# Patient Record
Sex: MALE | Race: WHITE | NOT HISPANIC OR LATINO | Employment: FULL TIME | URBAN - METROPOLITAN AREA
[De-identification: names, ages, dates, MRNs, and addresses within clinical notes are randomized per-mention and may not be internally consistent; named-entity substitution may affect disease eponyms.]

---

## 2017-01-31 ENCOUNTER — COMMUNICATION - HEALTHEAST (OUTPATIENT)
Dept: FAMILY MEDICINE | Facility: CLINIC | Age: 47
End: 2017-01-31

## 2017-01-31 DIAGNOSIS — E78.2 MIXED HYPERLIPIDEMIA: ICD-10-CM

## 2017-02-06 ENCOUNTER — OFFICE VISIT - HEALTHEAST (OUTPATIENT)
Dept: FAMILY MEDICINE | Facility: CLINIC | Age: 47
End: 2017-02-06

## 2017-02-06 DIAGNOSIS — E78.2 MIXED HYPERLIPIDEMIA: ICD-10-CM

## 2017-02-06 LAB
CHOLEST SERPL-MCNC: 190 MG/DL
FASTING STATUS PATIENT QL REPORTED: YES
HDLC SERPL-MCNC: 54 MG/DL
LDLC SERPL CALC-MCNC: 114 MG/DL
TRIGL SERPL-MCNC: 109 MG/DL

## 2017-02-06 ASSESSMENT — MIFFLIN-ST. JEOR: SCORE: 1808.8

## 2017-02-07 ENCOUNTER — COMMUNICATION - HEALTHEAST (OUTPATIENT)
Dept: FAMILY MEDICINE | Facility: CLINIC | Age: 47
End: 2017-02-07

## 2017-11-30 ENCOUNTER — OFFICE VISIT - HEALTHEAST (OUTPATIENT)
Dept: FAMILY MEDICINE | Facility: CLINIC | Age: 47
End: 2017-11-30

## 2017-11-30 DIAGNOSIS — J02.9 PHARYNGITIS: ICD-10-CM

## 2017-11-30 DIAGNOSIS — E78.2 MIXED HYPERLIPIDEMIA: ICD-10-CM

## 2017-11-30 LAB
CHOLEST SERPL-MCNC: 177 MG/DL
FASTING STATUS PATIENT QL REPORTED: YES
HDLC SERPL-MCNC: 51 MG/DL
LDLC SERPL CALC-MCNC: 108 MG/DL
TRIGL SERPL-MCNC: 90 MG/DL

## 2017-11-30 ASSESSMENT — MIFFLIN-ST. JEOR: SCORE: 1788.39

## 2017-12-01 ENCOUNTER — COMMUNICATION - HEALTHEAST (OUTPATIENT)
Dept: FAMILY MEDICINE | Facility: CLINIC | Age: 47
End: 2017-12-01

## 2018-04-14 ENCOUNTER — COMMUNICATION - HEALTHEAST (OUTPATIENT)
Dept: FAMILY MEDICINE | Facility: CLINIC | Age: 48
End: 2018-04-14

## 2018-04-16 RX ORDER — ACYCLOVIR 200 MG/1
CAPSULE ORAL
Qty: 30 CAPSULE | Refills: 1 | Status: SHIPPED | OUTPATIENT
Start: 2018-04-16 | End: 2021-09-03

## 2018-05-09 ENCOUNTER — OFFICE VISIT - HEALTHEAST (OUTPATIENT)
Dept: FAMILY MEDICINE | Facility: CLINIC | Age: 48
End: 2018-05-09

## 2018-05-09 DIAGNOSIS — F41.9 ANXIETY: ICD-10-CM

## 2018-05-09 LAB
ANION GAP SERPL CALCULATED.3IONS-SCNC: 10 MMOL/L (ref 5–18)
BUN SERPL-MCNC: 19 MG/DL (ref 8–22)
CALCIUM SERPL-MCNC: 10.1 MG/DL (ref 8.5–10.5)
CHLORIDE BLD-SCNC: 107 MMOL/L (ref 98–107)
CO2 SERPL-SCNC: 25 MMOL/L (ref 22–31)
CREAT SERPL-MCNC: 0.93 MG/DL (ref 0.7–1.3)
GFR SERPL CREATININE-BSD FRML MDRD: >60 ML/MIN/1.73M2
GLUCOSE BLD-MCNC: 102 MG/DL (ref 70–125)
POTASSIUM BLD-SCNC: 3.9 MMOL/L (ref 3.5–5)
SODIUM SERPL-SCNC: 142 MMOL/L (ref 136–145)
T4 FREE SERPL-MCNC: 0.9 NG/DL (ref 0.7–1.8)
TSH SERPL DL<=0.005 MIU/L-ACNC: 1.28 UIU/ML (ref 0.3–5)

## 2018-05-09 ASSESSMENT — MIFFLIN-ST. JEOR: SCORE: 1817.87

## 2018-05-10 ENCOUNTER — COMMUNICATION - HEALTHEAST (OUTPATIENT)
Dept: FAMILY MEDICINE | Facility: CLINIC | Age: 48
End: 2018-05-10

## 2018-06-25 ENCOUNTER — COMMUNICATION - HEALTHEAST (OUTPATIENT)
Dept: FAMILY MEDICINE | Facility: CLINIC | Age: 48
End: 2018-06-25

## 2018-06-25 ENCOUNTER — OFFICE VISIT - HEALTHEAST (OUTPATIENT)
Dept: FAMILY MEDICINE | Facility: CLINIC | Age: 48
End: 2018-06-25

## 2018-06-25 DIAGNOSIS — F41.9 ANXIETY: ICD-10-CM

## 2018-06-25 DIAGNOSIS — E78.2 MIXED HYPERLIPIDEMIA: ICD-10-CM

## 2018-06-25 LAB
ALBUMIN SERPL-MCNC: 4.1 G/DL (ref 3.5–5)
ALP SERPL-CCNC: 57 U/L (ref 45–120)
ALT SERPL W P-5'-P-CCNC: 28 U/L (ref 0–45)
AST SERPL W P-5'-P-CCNC: 26 U/L (ref 0–40)
BILIRUB DIRECT SERPL-MCNC: 0.2 MG/DL
BILIRUB SERPL-MCNC: 0.7 MG/DL (ref 0–1)
CHOLEST SERPL-MCNC: 177 MG/DL
FASTING STATUS PATIENT QL REPORTED: YES
HDLC SERPL-MCNC: 52 MG/DL
LDLC SERPL CALC-MCNC: 107 MG/DL
PROT SERPL-MCNC: 6.7 G/DL (ref 6–8)
TRIGL SERPL-MCNC: 90 MG/DL

## 2018-06-25 ASSESSMENT — MIFFLIN-ST. JEOR: SCORE: 1831.48

## 2018-08-13 ENCOUNTER — OFFICE VISIT - HEALTHEAST (OUTPATIENT)
Dept: FAMILY MEDICINE | Facility: CLINIC | Age: 48
End: 2018-08-13

## 2018-08-13 DIAGNOSIS — M72.2 PLANTAR FASCIITIS: ICD-10-CM

## 2018-08-13 DIAGNOSIS — F41.9 ANXIETY: ICD-10-CM

## 2018-12-31 ENCOUNTER — OFFICE VISIT - HEALTHEAST (OUTPATIENT)
Dept: FAMILY MEDICINE | Facility: CLINIC | Age: 48
End: 2018-12-31

## 2018-12-31 DIAGNOSIS — E78.2 MIXED HYPERLIPIDEMIA: ICD-10-CM

## 2018-12-31 DIAGNOSIS — Z12.11 SCREEN FOR COLON CANCER: ICD-10-CM

## 2018-12-31 DIAGNOSIS — M79.671 RIGHT FOOT PAIN: ICD-10-CM

## 2018-12-31 DIAGNOSIS — F41.9 ANXIETY: ICD-10-CM

## 2018-12-31 LAB
ALBUMIN SERPL-MCNC: 4.3 G/DL (ref 3.5–5)
ALP SERPL-CCNC: 54 U/L (ref 45–120)
ALT SERPL W P-5'-P-CCNC: 56 U/L (ref 0–45)
AST SERPL W P-5'-P-CCNC: 30 U/L (ref 0–40)
BILIRUB DIRECT SERPL-MCNC: 0.2 MG/DL
BILIRUB SERPL-MCNC: 0.8 MG/DL (ref 0–1)
CHOLEST SERPL-MCNC: 235 MG/DL
FASTING STATUS PATIENT QL REPORTED: YES
HDLC SERPL-MCNC: 55 MG/DL
LDLC SERPL CALC-MCNC: 153 MG/DL
PROT SERPL-MCNC: 7.3 G/DL (ref 6–8)
TRIGL SERPL-MCNC: 137 MG/DL

## 2019-02-08 ENCOUNTER — RECORDS - HEALTHEAST (OUTPATIENT)
Dept: ADMINISTRATIVE | Facility: OTHER | Age: 49
End: 2019-02-08

## 2019-07-22 ENCOUNTER — COMMUNICATION - HEALTHEAST (OUTPATIENT)
Dept: FAMILY MEDICINE | Facility: CLINIC | Age: 49
End: 2019-07-22

## 2019-07-22 DIAGNOSIS — F41.9 ANXIETY: ICD-10-CM

## 2019-07-22 DIAGNOSIS — E78.2 MIXED HYPERLIPIDEMIA: ICD-10-CM

## 2019-08-01 ENCOUNTER — OFFICE VISIT - HEALTHEAST (OUTPATIENT)
Dept: FAMILY MEDICINE | Facility: CLINIC | Age: 49
End: 2019-08-01

## 2019-08-01 DIAGNOSIS — E78.2 MIXED HYPERLIPIDEMIA: ICD-10-CM

## 2019-08-01 DIAGNOSIS — F41.9 ANXIETY: ICD-10-CM

## 2019-08-01 DIAGNOSIS — Z11.4 SCREENING FOR HUMAN IMMUNODEFICIENCY VIRUS: ICD-10-CM

## 2019-08-01 DIAGNOSIS — B00.1 RECURRENT HERPES LABIALIS: ICD-10-CM

## 2019-08-01 LAB
ALBUMIN SERPL-MCNC: 4.3 G/DL (ref 3.5–5)
ALP SERPL-CCNC: 54 U/L (ref 45–120)
ALT SERPL W P-5'-P-CCNC: 33 U/L (ref 0–45)
AST SERPL W P-5'-P-CCNC: 23 U/L (ref 0–40)
BILIRUB DIRECT SERPL-MCNC: 0.2 MG/DL
BILIRUB SERPL-MCNC: 0.8 MG/DL (ref 0–1)
CHOLEST SERPL-MCNC: 207 MG/DL
FASTING STATUS PATIENT QL REPORTED: YES
HDLC SERPL-MCNC: 56 MG/DL
HIV 1+2 AB+HIV1 P24 AG SERPL QL IA: NEGATIVE
LDLC SERPL CALC-MCNC: 135 MG/DL
PROT SERPL-MCNC: 6.9 G/DL (ref 6–8)
TRIGL SERPL-MCNC: 82 MG/DL

## 2019-08-01 ASSESSMENT — MIFFLIN-ST. JEOR: SCORE: 1786.12

## 2019-08-05 ENCOUNTER — COMMUNICATION - HEALTHEAST (OUTPATIENT)
Dept: FAMILY MEDICINE | Facility: CLINIC | Age: 49
End: 2019-08-05

## 2019-09-23 ENCOUNTER — RECORDS - HEALTHEAST (OUTPATIENT)
Dept: ADMINISTRATIVE | Facility: OTHER | Age: 49
End: 2019-09-23

## 2019-11-11 ENCOUNTER — AMBULATORY - HEALTHEAST (OUTPATIENT)
Dept: OTHER | Facility: CLINIC | Age: 49
End: 2019-11-11

## 2020-01-22 ENCOUNTER — OFFICE VISIT - HEALTHEAST (OUTPATIENT)
Dept: FAMILY MEDICINE | Facility: CLINIC | Age: 50
End: 2020-01-22

## 2020-01-22 DIAGNOSIS — E78.2 MIXED HYPERLIPIDEMIA: ICD-10-CM

## 2020-01-22 DIAGNOSIS — Z00.00 WELL ADULT EXAM: ICD-10-CM

## 2020-01-22 DIAGNOSIS — B00.1 RECURRENT HERPES LABIALIS: ICD-10-CM

## 2020-01-22 DIAGNOSIS — F41.9 ANXIETY: ICD-10-CM

## 2020-01-22 DIAGNOSIS — L98.9 SKIN LESION: ICD-10-CM

## 2020-01-22 DIAGNOSIS — R53.83 FATIGUE, UNSPECIFIED TYPE: ICD-10-CM

## 2020-01-22 LAB
ALBUMIN SERPL-MCNC: 4.2 G/DL (ref 3.5–5)
ALP SERPL-CCNC: 58 U/L (ref 45–120)
ALT SERPL W P-5'-P-CCNC: 26 U/L (ref 0–45)
ANION GAP SERPL CALCULATED.3IONS-SCNC: 7 MMOL/L (ref 5–18)
AST SERPL W P-5'-P-CCNC: 22 U/L (ref 0–40)
BILIRUB DIRECT SERPL-MCNC: 0.2 MG/DL
BILIRUB SERPL-MCNC: 0.7 MG/DL (ref 0–1)
BUN SERPL-MCNC: 14 MG/DL (ref 8–22)
CALCIUM SERPL-MCNC: 9.8 MG/DL (ref 8.5–10.5)
CHLORIDE BLD-SCNC: 105 MMOL/L (ref 98–107)
CHOLEST SERPL-MCNC: 204 MG/DL
CO2 SERPL-SCNC: 29 MMOL/L (ref 22–31)
CREAT SERPL-MCNC: 0.95 MG/DL (ref 0.7–1.3)
ERYTHROCYTE [DISTWIDTH] IN BLOOD BY AUTOMATED COUNT: 12 % (ref 11–14.5)
FASTING STATUS PATIENT QL REPORTED: YES
GFR SERPL CREATININE-BSD FRML MDRD: >60 ML/MIN/1.73M2
GLUCOSE BLD-MCNC: 95 MG/DL (ref 70–125)
HCT VFR BLD AUTO: 47.5 % (ref 40–54)
HDLC SERPL-MCNC: 53 MG/DL
HGB BLD-MCNC: 15.8 G/DL (ref 14–18)
LDLC SERPL CALC-MCNC: 132 MG/DL
MCH RBC QN AUTO: 30.4 PG (ref 27–34)
MCHC RBC AUTO-ENTMCNC: 33.3 G/DL (ref 32–36)
MCV RBC AUTO: 91 FL (ref 80–100)
PLATELET # BLD AUTO: 212 THOU/UL (ref 140–440)
PMV BLD AUTO: 8.6 FL (ref 7–10)
POTASSIUM BLD-SCNC: 4.3 MMOL/L (ref 3.5–5)
PROT SERPL-MCNC: 6.9 G/DL (ref 6–8)
RBC # BLD AUTO: 5.21 MILL/UL (ref 4.4–6.2)
SODIUM SERPL-SCNC: 141 MMOL/L (ref 136–145)
TRIGL SERPL-MCNC: 93 MG/DL
TSH SERPL DL<=0.005 MIU/L-ACNC: 1.18 UIU/ML (ref 0.3–5)
WBC: 5.6 THOU/UL (ref 4–11)

## 2020-01-22 ASSESSMENT — ANXIETY QUESTIONNAIRES
3. WORRYING TOO MUCH ABOUT DIFFERENT THINGS: SEVERAL DAYS
4. TROUBLE RELAXING: NOT AT ALL
6. BECOMING EASILY ANNOYED OR IRRITABLE: MORE THAN HALF THE DAYS
IF YOU CHECKED OFF ANY PROBLEMS ON THIS QUESTIONNAIRE, HOW DIFFICULT HAVE THESE PROBLEMS MADE IT FOR YOU TO DO YOUR WORK, TAKE CARE OF THINGS AT HOME, OR GET ALONG WITH OTHER PEOPLE: NOT DIFFICULT AT ALL
7. FEELING AFRAID AS IF SOMETHING AWFUL MIGHT HAPPEN: MORE THAN HALF THE DAYS
2. NOT BEING ABLE TO STOP OR CONTROL WORRYING: NOT AT ALL
5. BEING SO RESTLESS THAT IT IS HARD TO SIT STILL: NOT AT ALL
GAD7 TOTAL SCORE: 6
1. FEELING NERVOUS, ANXIOUS, OR ON EDGE: SEVERAL DAYS

## 2020-01-22 ASSESSMENT — MIFFLIN-ST. JEOR: SCORE: 1840.55

## 2020-07-22 ENCOUNTER — COMMUNICATION - HEALTHEAST (OUTPATIENT)
Dept: FAMILY MEDICINE | Facility: CLINIC | Age: 50
End: 2020-07-22

## 2020-07-30 ENCOUNTER — COMMUNICATION - HEALTHEAST (OUTPATIENT)
Dept: TELEHEALTH | Facility: CLINIC | Age: 50
End: 2020-07-30

## 2020-07-30 ENCOUNTER — OFFICE VISIT - HEALTHEAST (OUTPATIENT)
Dept: FAMILY MEDICINE | Facility: CLINIC | Age: 50
End: 2020-07-30

## 2020-07-30 DIAGNOSIS — Z12.5 SCREENING FOR PROSTATE CANCER: ICD-10-CM

## 2020-07-30 DIAGNOSIS — E78.2 MIXED HYPERLIPIDEMIA: ICD-10-CM

## 2020-07-30 DIAGNOSIS — F41.9 ANXIETY: ICD-10-CM

## 2020-07-30 DIAGNOSIS — Z23 NEED FOR SHINGLES VACCINE: ICD-10-CM

## 2020-07-30 LAB
ALBUMIN SERPL-MCNC: 4.4 G/DL (ref 3.5–5)
ALP SERPL-CCNC: 56 U/L (ref 45–120)
ALT SERPL W P-5'-P-CCNC: 31 U/L (ref 0–45)
ANION GAP SERPL CALCULATED.3IONS-SCNC: 8 MMOL/L (ref 5–18)
AST SERPL W P-5'-P-CCNC: 21 U/L (ref 0–40)
BILIRUB SERPL-MCNC: 0.5 MG/DL (ref 0–1)
BUN SERPL-MCNC: 13 MG/DL (ref 8–22)
CALCIUM SERPL-MCNC: 9.9 MG/DL (ref 8.5–10.5)
CHLORIDE BLD-SCNC: 106 MMOL/L (ref 98–107)
CHOLEST SERPL-MCNC: 199 MG/DL
CO2 SERPL-SCNC: 26 MMOL/L (ref 22–31)
CREAT SERPL-MCNC: 0.92 MG/DL (ref 0.7–1.3)
FASTING STATUS PATIENT QL REPORTED: YES
GFR SERPL CREATININE-BSD FRML MDRD: >60 ML/MIN/1.73M2
GLUCOSE BLD-MCNC: 89 MG/DL (ref 70–125)
HDLC SERPL-MCNC: 51 MG/DL
LDLC SERPL CALC-MCNC: 129 MG/DL
POTASSIUM BLD-SCNC: 4.5 MMOL/L (ref 3.5–5)
PROT SERPL-MCNC: 7.1 G/DL (ref 6–8)
PSA SERPL-MCNC: 1.5 NG/ML (ref 0–3.5)
SODIUM SERPL-SCNC: 140 MMOL/L (ref 136–145)
TRIGL SERPL-MCNC: 94 MG/DL

## 2020-07-30 RX ORDER — ESCITALOPRAM OXALATE 10 MG/1
10 TABLET ORAL DAILY
Qty: 90 TABLET | Refills: 2 | Status: SHIPPED | OUTPATIENT
Start: 2020-07-30 | End: 2021-07-06

## 2020-07-30 RX ORDER — RIBOFLAVIN (VITAMIN B2) 100 MG
1 TABLET ORAL 3 TIMES DAILY
Status: SHIPPED | COMMUNITY
Start: 2020-07-30 | End: 2023-06-27

## 2020-07-30 RX ORDER — SIMVASTATIN 20 MG
TABLET ORAL
Qty: 90 TABLET | Refills: 2 | Status: SHIPPED | OUTPATIENT
Start: 2020-07-30 | End: 2021-07-06

## 2020-07-30 RX ORDER — CHLORAL HYDRATE 500 MG
2 CAPSULE ORAL DAILY
Status: SHIPPED | COMMUNITY
Start: 2020-07-30

## 2020-07-31 ENCOUNTER — COMMUNICATION - HEALTHEAST (OUTPATIENT)
Dept: FAMILY MEDICINE | Facility: CLINIC | Age: 50
End: 2020-07-31

## 2020-10-13 ENCOUNTER — RECORDS - HEALTHEAST (OUTPATIENT)
Dept: ADMINISTRATIVE | Facility: OTHER | Age: 50
End: 2020-10-13

## 2020-11-02 ENCOUNTER — COMMUNICATION - HEALTHEAST (OUTPATIENT)
Dept: FAMILY MEDICINE | Facility: CLINIC | Age: 50
End: 2020-11-02

## 2020-11-02 DIAGNOSIS — Z23 NEED FOR SHINGLES VACCINE: ICD-10-CM

## 2020-11-03 ENCOUNTER — AMBULATORY - HEALTHEAST (OUTPATIENT)
Dept: NURSING | Facility: CLINIC | Age: 50
End: 2020-11-03

## 2020-11-03 DIAGNOSIS — Z23 NEED FOR SHINGLES VACCINE: ICD-10-CM

## 2020-11-10 ENCOUNTER — COMMUNICATION - HEALTHEAST (OUTPATIENT)
Dept: FAMILY MEDICINE | Facility: CLINIC | Age: 50
End: 2020-11-10

## 2021-05-28 ASSESSMENT — ANXIETY QUESTIONNAIRES: GAD7 TOTAL SCORE: 6

## 2021-05-30 VITALS — HEIGHT: 74 IN | BODY MASS INDEX: 25.15 KG/M2 | WEIGHT: 196 LBS

## 2021-05-31 VITALS — WEIGHT: 195 LBS | BODY MASS INDEX: 25.84 KG/M2 | HEIGHT: 73 IN

## 2021-05-31 NOTE — PROGRESS NOTES
Assessment:  1.  Hyperlipidemia, checking status.  2.  Anxiety, not under ideal control here.  3.  Recurrent herpes labialis, responds well to the acyclovir.  4.  Needs screening for HIV, is low risk.    Plan: Recommend try increasing dose of escitalopram to 10 mg daily-prescription for #90 reffelix x1 sent to his pharmacy.  Check fasting lipid and hepatic profile and HIV screening.  Continue current dose of simvastatin.  Plan follow-up in January i.e. 6 months-for his overall physical visit.  Call return if any difficulty with the higher dose of S-Citalopram or if he is not improving.    Subjective: 48-year-old male presenting for follow-up and/or evaluation of the above.  Regarding lipids he has occasional loose stool but it is tolerable.  No constipation muscle aching or muscle weakness.  Regarding anxiety, he does note that in the last 3 weeks or so he is been having more trouble with anger, more trouble with getting worked up.  He states that is a little bit better this week, he has been doing a lot of praying, and he tries to meditate and exercise.  He did have a recurrence of his cold sore here this summer and he notes that it can happen with a sunburn of the lips etc.  But the acyclovir does work well for him.  ED-7 score today is 9.  Patient Active Problem List   Diagnosis     Mixed Hyperlipoproteinemia     Anxiety     Recurrent herpes labialis     History reviewed. No pertinent past medical history.  No Known Allergies  Current Outpatient Medications   Medication Sig Dispense Refill     simvastatin (ZOCOR) 20 MG tablet TAKE 1 TABLET BY MOUTH ONCE DAILY IN THE EVENING 90 tablet 1     acyclovir (ZOVIRAX) 200 MG capsule TAKE ONE CAPSULE UP TO 5 TIMES DAILY FOR 5 DAYS AS NEEDED FOR RECURRENT COLD SORES. 30 capsule 1     escitalopram oxalate (LEXAPRO) 10 MG tablet Take 1 tablet (10 mg total) by mouth daily. 90 tablet 1     No current facility-administered medications for this visit.    Note that the above  "listed medication is after the increase today of the escitalopram from 5 mg a day to the 10 mg a day dose.  All other review of systems are negative.    Objective:/70   Pulse (!) 56   Ht 6' 2\" (1.88 m)   Wt 191 lb (86.6 kg)   SpO2 98%   BMI 24.52 kg/m    HEENT examination is negative.  Neck supple without adenopathy or thyromegaly.  Lungs clear.  Heart regular rate and rhythm without murmur.  Abdomen shows no masses tenderness or hepatosplenomegaly.  No pedal edema.  He is alert with clear speech.  Mood appears neutral.  "

## 2021-06-01 VITALS — WEIGHT: 198 LBS | BODY MASS INDEX: 25.41 KG/M2 | HEIGHT: 74 IN

## 2021-06-01 VITALS — BODY MASS INDEX: 25.8 KG/M2 | HEIGHT: 74 IN | WEIGHT: 201 LBS

## 2021-06-01 VITALS — WEIGHT: 196 LBS | BODY MASS INDEX: 25.16 KG/M2

## 2021-06-02 VITALS — WEIGHT: 205 LBS | BODY MASS INDEX: 26.32 KG/M2

## 2021-06-03 VITALS — HEIGHT: 74 IN | WEIGHT: 191 LBS | BODY MASS INDEX: 24.51 KG/M2

## 2021-06-04 VITALS
DIASTOLIC BLOOD PRESSURE: 74 MMHG | SYSTOLIC BLOOD PRESSURE: 104 MMHG | OXYGEN SATURATION: 97 % | BODY MASS INDEX: 25.94 KG/M2 | WEIGHT: 202 LBS | HEART RATE: 61 BPM

## 2021-06-04 VITALS
BODY MASS INDEX: 26.05 KG/M2 | WEIGHT: 203 LBS | OXYGEN SATURATION: 97 % | HEART RATE: 67 BPM | HEIGHT: 74 IN | TEMPERATURE: 98.2 F | SYSTOLIC BLOOD PRESSURE: 110 MMHG | DIASTOLIC BLOOD PRESSURE: 70 MMHG

## 2021-06-05 NOTE — PROGRESS NOTES
Assessment:      Healthy male exam.    1. Well adult exam     2. Mixed Hyperlipoproteinemia  Lipid Cascade    Hepatic Profile    simvastatin (ZOCOR) 20 MG tablet   3. Anxiety  escitalopram oxalate (LEXAPRO) 10 MG tablet   4. Recurrent herpes labialis     5. Fatigue, unspecified type  Hepatic Profile    Basic Metabolic Panel    HM2(CBC w/o Differential)    Thyroid Stimulating Hormone (TSH)   6. Skin lesion            Plan:       Check labs ordered. Explained that the skin lesion looked benign and no sign of cancer but because it bothers him he does wish to schedule for excision of that and will do so in the near future.  I reviewed risks and benefits, explained he would need to have some sutures that could be removed a week after the excision was done.  If above lab is okay then it is his choice on whether we try to move back to 5 mg dose on the S-Citalopram or leave him at the 10 mg dose.  I did go ahead and refill both simvastatin and escitalopram 10 mg today.  Do follow-up in 6 months but earlier if any difficulties.    Subjective:      Jean Shankar is a 49 y.o. male who presents for an annual exam. The patient reports that there is not domestic violence in his life.  He has a spot behind his right ear that he would like checked.  Since someone mentioned that he notes it has bothered him but he has had it probably all of his life.  Regarding lipids no diarrhea constipation muscle ache or muscle weakness.  He does take his medication regular.  Regarding the anxiety he does question whether or not he should stay on the 10 mg dose because he feels like he is gotten more tired since he is been on that but he notes that when he was on the 5 he still had more anger.  So he is uncertain about whether or not he would want to change that dose.  He is not aware of any other cause for fatigue.  No fever nausea vomiting.  No chest pain or trouble breathing.    Healthy Habits:   Regular Exercise: No  Sunscreen Use:  Yes  Healthy Diet: No  Dental Visits Regularly: Yes  Seat Belt: Yes  Sexually active: No  Monthly Self Testicular Exams:  No  Hemoccults: No  Flex Sig: No  Colonoscopy: Yes  Lipid Profile: Yes  Glucose Screen: Yes  Prevention of Osteoporosis: Yes  Last Dexa: Yes  Guns at Home:  Yes  Guns Safety Locks:  Yes and Gun safe/class:  Yes      Immunization History   Administered Date(s) Administered     DT (pediatric) 01/01/1998     Influenza, inj, historic,unspecified 10/21/2018     Influenza, seasonal,quad inj 6-35 mos 09/25/2013     Influenza,seasonal quad, PF, =/> 6months 09/23/2019     Td,adult,historic,unspecified 10/02/2000, 04/23/2009, 04/29/2009     Tdap 06/08/2016     Immunization status: up to date and documented, discussed shingles vaccine and recommend he check c insurance re coverage..    No exam data present     Current Outpatient Medications   Medication Sig Dispense Refill     acyclovir (ZOVIRAX) 200 MG capsule TAKE ONE CAPSULE UP TO 5 TIMES DAILY FOR 5 DAYS AS NEEDED FOR RECURRENT COLD SORES. 30 capsule 1     escitalopram oxalate (LEXAPRO) 10 MG tablet Take 1 tablet (10 mg total) by mouth daily. 90 tablet 1     simvastatin (ZOCOR) 20 MG tablet TAKE 1 TABLET BY MOUTH ONCE DAILY IN THE EVENING 90 tablet 1     No current facility-administered medications for this visit.      History reviewed. No pertinent past medical history.  Past Surgical History:   Procedure Laterality Date     WISDOM TOOTH EXTRACTION  1993     Patient has no known allergies.  Family History   Problem Relation Age of Onset     Hyperlipidemia Mother      Cancer Father         prostate cancer     Social History     Socioeconomic History     Marital status:      Spouse name: Not on file     Number of children: Not on file     Years of education: Not on file     Highest education level: Not on file   Occupational History     Not on file   Social Needs     Financial resource strain: Not on file     Food insecurity:     Worry: Not on  "file     Inability: Not on file     Transportation needs:     Medical: Not on file     Non-medical: Not on file   Tobacco Use     Smoking status: Former Smoker     Types: Cigarettes, Cigars     Last attempt to quit: 2000     Years since quittin.0     Smokeless tobacco: Former User   Substance and Sexual Activity     Alcohol use: Yes     Comment: less than one beer per week     Drug use: No     Sexual activity: Not on file   Lifestyle     Physical activity:     Days per week: Not on file     Minutes per session: Not on file     Stress: Not on file   Relationships     Social connections:     Talks on phone: Not on file     Gets together: Not on file     Attends [employer] service: Not on file     Active member of club or organization: Not on file     Attends meetings of clubs or organizations: Not on file     Relationship status: Not on file     Intimate partner violence:     Fear of current or ex partner: Not on file     Emotionally abused: Not on file     Physically abused: Not on file     Forced sexual activity: Not on file   Other Topics Concern     Not on file   Social History Narrative     Not on file       Review of Systems  Review of Systems    All other review of systems are negative.      Objective:     Vitals:    20 0814   BP: 110/70   Pulse: 67   Temp: 98.2  F (36.8  C)   TempSrc: Oral   SpO2: 97%   Weight: 203 lb (92.1 kg)   Height: 6' 2\" (1.88 m)     Body mass index is 26.06 kg/m .    Physical  Physical Exam     Alert male.  Head normocephalic.  External ears and TMs are normal.  Behind the right ear there is a fleshy papule about 5 to 6 mm diameter that is elevated about 4 mm but does look entirely benign.  Eyes show pupils equal round and reactive to light accommodation.  Nose and oropharynx are negative.  Neck supple without adenopathy or thyromegaly.  Lungs clear.  Heart regular rate and rhythm without murmur.  Abdomen shows no masses tenderness or hepatosplenomegaly.  Genitalia normal " with normal testes.  Rectal examination unremarkable with smooth prostate.  No pedal edema.  Good peripheral pulses.  Except for the lesion behind the right ear the skin is otherwise unremarkable.  He is alert with clear speech.

## 2021-06-08 NOTE — PROGRESS NOTES
"Assessment:  1.  Hyperlipidemia.    Plan: Check fasting lipid profile and hepatic profile.  Prescribed simvastatin 20 mg-#90-refill times 1-1 by mouth every afternoon.  Encouraged regular physical exercise and healthy diet.  Follow up in 6 months or sooner as needed.    Subjective: 46-year-old male presented for follow-up on hyperlipidemia.  No diarrhea constipation most likely or muscle weakness.  Takes his simvastatin in the evening at suppertime.  Is not doing any regular aerobic exercise.  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  History reviewed. No pertinent past medical history.  Current Outpatient Prescriptions   Medication Sig Dispense Refill     simvastatin (ZOCOR) 20 MG tablet TAKE ONE TABLET BY MOUTH EVERY DAY IN THE EVENING. 90 tablet 1     No current facility-administered medications for this visit.      No Known Allergies  All other review of systems currently negative.    Objective:  Visit Vitals     /60     Pulse 64     Resp 14     Ht 6' 2\" (1.88 m)     Wt 196 lb (88.9 kg)     BMI 25.16 kg/m2     Head normocephalic.  External ears and TMs normal.  Eyes nose and oropharynx show to change.  Neck supple without adenopathy or thyromegaly.  Lungs clear.  Heart regular rate and rhythm without murmur.  Abdomen shows no masses tenderness or hepatosplenomegaly.  No pedal edema.  Alert with clear speech.  "

## 2021-06-09 NOTE — TELEPHONE ENCOUNTER
Called and informed him of my reitirement Aug 13th.  He has appt with Dio on 7/30 for med check visit.  Depending on results consider whether or not to change to more potent statin for better lipid control.

## 2021-06-10 NOTE — PATIENT INSTRUCTIONS - HE
Everything is looking good!    We should be calling you in 9-12 months to check back in for a physical.    First shingles shot today with the next in 2-6 months.

## 2021-06-10 NOTE — PROGRESS NOTES
Chief Complaint   Patient presents with     Labs Only     Fasting cholesterol test.      Immunizations     Would like to get shingles shot ordered as preventative care.        HPI: Patient presents today for medication check.  Currently taking simvastatin 20 mg for hyperlipidemia.  Labs have been well controlled.  No myalgias.  No abdominal tenderness.  He also continues with Lexapro 10 mg.  Does not feel like any changes are needed.  No suicidal or homicidal ideation.    Patient is interested in the Shingrix vaccine today.    ROS:Review of Systems - negative except for what's listed in the HPI    SH: The Patient's  reports that he quit smoking about 20 years ago. His smoking use included cigarettes and cigars. He has quit using smokeless tobacco. He reports current alcohol use. He reports that he does not use drugs.      FH: The Patient's family history includes Cancer in his father; Hyperlipidemia in his mother.     Meds:    Current Outpatient Medications on File Prior to Visit   Medication Sig Dispense Refill     fish oil-omega-3 fatty acids (FISH OIL) 300-1,000 mg capsule Take 2 g by mouth daily.       glucosamine-chondroitin 500-400 mg tablet Take 1 tablet by mouth 3 (three) times a day.       multivitamin therapeutic tablet Take 1 tablet by mouth daily.       [DISCONTINUED] escitalopram oxalate (LEXAPRO) 10 MG tablet Take 1 tablet (10 mg total) by mouth daily. 90 tablet 1     [DISCONTINUED] simvastatin (ZOCOR) 20 MG tablet TAKE 1 TABLET BY MOUTH ONCE DAILY IN THE EVENING 90 tablet 1     acyclovir (ZOVIRAX) 200 MG capsule TAKE ONE CAPSULE UP TO 5 TIMES DAILY FOR 5 DAYS AS NEEDED FOR RECURRENT COLD SORES. 30 capsule 1     No current facility-administered medications on file prior to visit.        O:  /74   Pulse 61   Wt 202 lb (91.6 kg)   SpO2 97%   BMI 25.94 kg/m      Physical Examination:   General appearance - alert, well appearing, and in no distress  Mental status - alert, oriented to person,  place, and time  Chest - clear to auscultation, no wheezes, rales or rhonchi, symmetric air entry  Heart - normal rate and regular rhythm, S1 and S2 normal, no murmurs noted  Abdomen - soft, nontender, nondistended, no masses or organomegaly  Musculoskeletal - no joint tenderness, deformity or swelling  Extremities - peripheral pulses normal, no pedal edema, no cyanosis  Skin - normal coloration and turgor.      A/P:     Problem List Items Addressed This Visit        [facility] Cardiac Problems    Mixed Hyperlipoproteinemia    Relevant Medications    fish oil-omega-3 fatty acids (FISH OIL) 300-1,000 mg capsule    simvastatin (ZOCOR) 20 MG tablet    Other Relevant Orders    Lipid [place] FASTING    Comprehensive Metabolic Panel       Other    Anxiety - Primary    Relevant Medications    escitalopram oxalate (LEXAPRO) 10 MG tablet      Other Visit Diagnoses     Screening for prostate cancer        Relevant Orders    PSA, Annual Screen (Prostatic-Specific Antigen)    Need for shingles vaccine        Relevant Orders    Varicella Zoster, Recombinant Vaccine IM (Completed)            1. Anxiety  Well-controlled.  Refill sent to the pharmacy.  - escitalopram oxalate (LEXAPRO) 10 MG tablet; Take 1 tablet (10 mg total) by mouth daily.  Dispense: 90 tablet; Refill: 2    2. Mixed Hyperlipoproteinemia  Well-controlled.  Refill sent to the pharmacy.  - simvastatin (ZOCOR) 20 MG tablet; TAKE 1 TABLET BY MOUTH ONCE DAILY IN THE EVENING  Dispense: 90 tablet; Refill: 2  - Lipid [place] FASTING  - Comprehensive Metabolic Panel    3. Screening for prostate cancer  - PSA, Annual Screen (Prostatic-Specific Antigen)    4. Need for shingles vaccine  - Varicella Zoster, Recombinant Vaccine IM        Dio Syed, CNP

## 2021-06-13 NOTE — TELEPHONE ENCOUNTER
Received notice from Jessica on Teams that pt needed a call in regards to his shingles shot.     Called pt and he states that he reacted to the vaccine. He had whole body aches and joint pain and stiffness. Lasted about 12 hours. Took advil and that helped a lot. Currently without symptoms. Pt just wanted to report reaction to vaccine and that he could not get a hold of anyone that day. No further action needed at this time.     Katie Andrew, CMA

## 2021-06-14 NOTE — PROGRESS NOTES
"Assessment:  1.  Hyperlipidemia, checking status.  2.  Pharyngitis.    Plan: Check fasting lipid and hepatic profiles.  Renewed simvastatin for the next 6 months.  Recommend symptomatic care for the sore throat and call or return if not clearing well.  He was also given the honoring choices form and encouraged to fill out on a routine basis to have advanced directives.  He understands and agrees.    Subjective: 47-year-old male presenting for follow-up on the lipid issue.  Is fasting.  No constipation diarrhea muscle aching or muscle weakness.  He does note his stools are a little bit softer since being on the simvastatin.  He has had a sore throat in the last 2 days but no significant fever, no other significant symptoms.  No cough.  History reviewed. No pertinent past medical history.  No Known Allergies  Current Outpatient Prescriptions   Medication Sig Dispense Refill     simvastatin (ZOCOR) 20 MG tablet TAKE ONE TABLET BY MOUTH EVERY DAY IN THE EVENING. 90 tablet 1     No current facility-administered medications for this visit.      All other review of systems are negative.    Objective:/72  Pulse 66  Ht 6' 1\" (1.854 m)  Wt 195 lb (88.5 kg)  BMI 25.73 kg/m2  Head number cephalic.  External ears and TMs normal.  Eyes unremarkable.  Nose negative.  Mild erythema in the pharynx but no exudate.  Neck supple without adenopathy or thyromegaly.  Lungs clear.  Heart regular rate and rhythm without murmur.  Abdomen shows no masses tenderness or hepatosplenomegaly.  "

## 2021-06-16 PROBLEM — B00.1 RECURRENT HERPES LABIALIS: Status: ACTIVE | Noted: 2019-08-01

## 2021-06-16 PROBLEM — F41.9 ANXIETY: Status: ACTIVE | Noted: 2018-05-09

## 2021-06-17 NOTE — PROGRESS NOTES
"Assessment:  1.  Anxiety.  2.  Possible caffeinism.  3.  Possible obsessive-compulsive disorder.    Plan: Commended he taper off the caffeine over the next week.  Check T4 TSH and basic profile.  Follow-up in 4-6 weeks but earlier as needed.  If not making good progress we will plan on starting escitalopram 5 mg daily.  He will follow through with the employee assistance counselor.  Plan to recheck fasting lipid profile and hepatic profile on his return to clinic.    Subjective: 47-year-old male presenting for difficulty with anxiety etc.  His PHQ-9 score is 15.  His ED-7 score is 19.  He notes that he wakes at times at night and has a hard time getting back to sleep.  He often has anxiousness which can affect how he is doing in his job.  He saw the employee assistance counselor and they recommended that he consider medication.  His wife had taken medication for anxiety but he saw that she had often been on a roller coaster with her situation.  He does acknowledge that he is obsessive-compulsive.  He does drink about 4 cups of coffee per day.  History reviewed. No pertinent past medical history.  No Known Allergies  Current Outpatient Prescriptions   Medication Sig Dispense Refill     simvastatin (ZOCOR) 20 MG tablet TAKE ONE TABLET BY MOUTH EVERY DAY IN THE EVENING. 90 tablet 1     acyclovir (ZOVIRAX) 200 MG capsule TAKE ONE CAPSULE UP TO 5 TIMES DAILY FOR 5 DAYS AS NEEDED FOR RECURRENT COLD SORES. 30 capsule 1     No current facility-administered medications for this visit.      He is not fasting this afternoon.  He denies any suicidal ideation etc.  All other review of systems currently negative.    Objective:/64  Pulse 62  Resp 16  Ht 6' 2\" (1.88 m)  Wt 198 lb (89.8 kg)  SpO2 97%  BMI 25.42 kg/m2  HEENT examination is negative.  Neck supple.  Lungs clear.  Heart regular rate and rhythm without murmur.  Abdomen shows no masses tenderness or hepatosplenomegaly.  No pedal edema.  Is alert with clear " speech.

## 2021-06-18 NOTE — PROGRESS NOTES
"Assessment:  1.  Anxiety.  2.  Hyperlipidemia.  3.  At least obsessive-compulsive personality traits.    Plan: Check fasting lipid and hepatic profile.  Renewed simvastatin 90 day refillable ×1.  After discussion of options recommend that he start trial of escitalopram 5 mg #30 refillable ×1-1 p.o. daily.  Reviewed risks and benefits.  Follow-up in 6 weeks for recheck.  He understands and agrees.  Recommend he continue to minimize his caffeine intake as that has helped him to some extent.    Subjective: 47-year-old male presenting for follow-up on the above.  See the note in May.  He has reduced his caffeine intake and brings in a daily diary of the amount he drinks.  He notes he has had 19 days without any coffee, 7 days with 1/2 cup, 10 days with 1 cup, and 5 days with 2 cups of coffee.  Although he notes some improvement in his anxiety, he is interested in trying medication for his anxiety and he understands from his counselor that it would not be addictive.  He has been  now with that being finalized in January 2017.  PHQ-9 score today is 7.  ED-7 score is down to 14 from previous.  No past medical history on file.  No Known Allergies  Current medication includes his simvastatin 20 mg every afternoon and the use of the acyclovir as needed.  He simply continues to feel anxious enough that he would like to try taking medication and seeing how it works.  All other review of systems are negative.    Objective:/70  Pulse (!) 46  Ht 6' 2\" (1.88 m)  Wt 201 lb (91.2 kg)  SpO2 98%  BMI 25.81 kg/m2  HEENT examination is negative.  Neck supple without adenopathy or thyromegaly.  Lungs clear.  Heart regular rate and rhythm without murmur.  Abdomen shows no masses tenderness or hepatosplenomegaly.  No pedal edema.  Is alert with clear speech.  His notebook as meticulous documentation regarding his daily caffeine intake.  "

## 2021-06-19 NOTE — PROGRESS NOTES
Assessment:  1.  Anxiety, improved.  2.  Plantar fasciitis.    Plan: Recommend he take the S-Citalopram medication at bedtime instead of the morning.  It is okay to take that with the simvastatin.  Follow-up in December for semiannual med check and be fasting but return earlier as needed.  Recommend stretching of the arch and Achilles tendon in both feet but especially the right one that has bothered him.  Recommend that he try using over-the-counter arch supports to see if that helps his plantar fasciitis.  Follow-up as needed on that issue.    Subjective: 47-year-old male presenting for follow-up of his anxiety and having questions about plantar fasciitis.  See the note in late June.  He does note there is been definite improvement with taking the S-Citalopram.  He notes that he has been sleeping better, has less anger, less worry prior to returning to work etc.  He notes that on August 6 he got his best night of sleep in a long time.  He does state that he has had about 5 days of some diarrhea that were off and on in this last 6 weeks, and he had about 3 days early on where he felt ill or weak.  PHQ-9 score today is 10 and ED-7 score is 7.  He would prefer to stay on his current dose of medication as he does feel like it is been useful in helping him out.  He notes he has had soreness in his right foot arch as well as at the heel.  He works in an area with flammable liquids and so he has to be careful to have shoes that do not create static electricity.  No past medical history on file.  No Known Allergies  Current Outpatient Prescriptions   Medication Sig Dispense Refill     escitalopram oxalate (LEXAPRO) 5 MG tablet Take 1 tablet (5 mg total) by mouth daily. 30 tablet 4     simvastatin (ZOCOR) 20 MG tablet TAKE ONE TABLET BY MOUTH EVERY DAY IN THE EVENING. 90 tablet 1     acyclovir (ZOVIRAX) 200 MG capsule TAKE ONE CAPSULE UP TO 5 TIMES DAILY FOR 5 DAYS AS NEEDED FOR RECURRENT COLD SORES. 30 capsule 1     No  current facility-administered medications for this visit.      All other review of systems are negative.    Objective:/70  Pulse 83  Resp 22  Wt 196 lb (88.9 kg)  SpO2 97%  BMI 25.16 kg/m2  HEENT exam is negative.  Neck supple without adenopathy or thyromegaly  Lungs clear.  Heart regular rate and rhythm without murmur.  Abdomen shows no masses tenderness or hepatosplenomegaly.  No pedal edema.  No visible abnormality of the right foot and arch.  He describes his soreness in the plantar arch area.

## 2021-06-19 NOTE — LETTER
Letter by Jeet Randolph MD at      Author: Jeet Randolph MD Service: -- Author Type: --    Filed:  Encounter Date: 8/5/2019 Status: (Other)         [facility] [address]             August 5, 2019         Dear Mr. Shankar,    Below are the results from your recent visit:    Resulted Orders   HIV Antigen/Antibody Screening Cascade   Result Value Ref Range    HIV Antigen / Antibody Negative Negative    Narrative    Method is Abbott HIV Ag/Ab for the detection of HIV p24 antigen, HIV-1 antibodies and HIV-2 antibodies.   Lipid Cascade   Result Value Ref Range    Cholesterol 207 (H) <=199 mg/dL    Triglycerides 82 <=149 mg/dL    HDL Cholesterol 56 >=40 mg/dL    LDL Calculated 135 (H) <=129 mg/dL    Patient Fasting > 8hrs? Yes    Hepatic Profile   Result Value Ref Range    Bilirubin, Total 0.8 0.0 - 1.0 mg/dL    Bilirubin, Direct 0.2 <=0.5 mg/dL    Protein, Total 6.9 6.0 - 8.0 g/dL    Albumin 4.3 3.5 - 5.0 g/dL    Alkaline Phosphatase 54 45 - 120 U/L    AST 23 0 - 40 U/L    ALT 33 0 - 45 U/L        HIV test negative, normal liver function tests, adequate control of cholesterol, follow up in 6 months.     Please call with questions or contact us using [employer].    Sincerely,        Electronically signed by Jeet Randolph MD

## 2021-06-20 NOTE — LETTER
Letter by Dio Syed CNP at      Author: Dio Syed CNP Service: -- Author Type: --    Filed:  Encounter Date: 7/31/2020 Status: (Other)         Jean IMAN Shankar  [facility] [address]             July 31, 2020         Dear Mr. Shankar,    Below are the results from your recent visit:    Resulted Orders   Lipid [place] FASTING   Result Value Ref Range    Cholesterol 199 <=199 mg/dL    Triglycerides 94 <=149 mg/dL    HDL Cholesterol 51 >=40 mg/dL    LDL Calculated 129 <=129 mg/dL    Patient Fasting > 8hrs? Yes    Comprehensive Metabolic Panel   Result Value Ref Range    Sodium 140 136 - 145 mmol/L    Potassium 4.5 3.5 - 5.0 mmol/L    Chloride 106 98 - 107 mmol/L    CO2 26 22 - 31 mmol/L    Anion Gap, Calculation 8 5 - 18 mmol/L    Glucose 89 70 - 125 mg/dL    BUN 13 8 - 22 mg/dL    Creatinine 0.92 0.70 - 1.30 mg/dL    GFR MDRD Af Amer >60 >60 mL/min/1.73m2    GFR MDRD Non Af Amer >60 >60 mL/min/1.73m2    Bilirubin, Total 0.5 0.0 - 1.0 mg/dL    Calcium 9.9 8.5 - 10.5 mg/dL    Protein, Total 7.1 6.0 - 8.0 g/dL    Albumin 4.4 3.5 - 5.0 g/dL    Alkaline Phosphatase 56 45 - 120 U/L    AST 21 0 - 40 U/L    ALT 31 0 - 45 U/L    Narrative    Fasting Glucose reference range is 70-99 mg/dL per  American Diabetes Association (ADA) guidelines.   PSA, Annual Screen (Prostatic-Specific Antigen)   Result Value Ref Range    PSA 1.5 0.0 - 3.5 ng/mL    Narrative    Method is Abbott Prostate-Specific Antigen (PSA)  Standard-WHO 1st International (90:10)       Prostate screen looks fine.  Normal kidneys, liver, and electrolytes.  Cholesterol levels look great!    Please call with questions or contact us using [employer]    Sincerely,         Dio Syed CNP

## 2021-06-22 NOTE — PROGRESS NOTES
Assessment:  1.  Hyperlipidemia, checking status.  2.  Anxiety, reasonably controlled.  3.  Occasional mild loose stool from the simvastatin.  4.  Foot pain, resolving.    Plan: Check fasting lipid and hepatic profile.  Renewed simvastatin and escitalopram for 90-day supplies with additional refill.  Plan follow-up in 6 months for next med check.  If further troubles with occasional diarrhea than certainly call or follow-up sooner.  Continue adequate fiber and vegetables in diet.  Consider trying nonweightbearing exercise until foot pain resolves.    Subjective: 48-year-old male presenting for follow-up on the above.  Regarding lipids no constipation muscle aching or muscle weakness.  He does have occasional loose stool but it is not frequent.  He does not feel that it is a significant bother.  He does try to eat a high-fiber diet.  His ED-7 score is 7.  He feels like it is substantially improved and he is happy with the results of the escitalopram.  He does note there is been enough stress at work that there are times that he can get angry.  He notes that exercise had been a help for him and managing stress.  But then he had had some plantar fasciitis and it took a while for that to settle down and then he had just gotten back to the gym and doing walking on the treadmill and he accidentally dropped a coffee mug on his foot and it got sore and it still settling down.  But it is better.  History reviewed. No pertinent past medical history.  No Known Allergies  Current Outpatient Medications   Medication Sig Dispense Refill     acyclovir (ZOVIRAX) 200 MG capsule TAKE ONE CAPSULE UP TO 5 TIMES DAILY FOR 5 DAYS AS NEEDED FOR RECURRENT COLD SORES. 30 capsule 1     escitalopram oxalate (LEXAPRO) 5 MG tablet Take 1 tablet (5 mg total) by mouth daily. 90 tablet 1     simvastatin (ZOCOR) 20 MG tablet TAKE ONE TABLET BY MOUTH EVERY DAY IN THE EVENING. 90 tablet 1     No current facility-administered medications for this  visit.      All other review of systems are negative.    Objective:/74   Pulse (!) 53   Resp 18   Wt 205 lb (93 kg)   SpO2 98%   BMI 26.32 kg/m    Alert male.  HEENT examination is negative.  Neck supple without adenopathy or thyromegaly.  Lungs clear.  Heart regular rate and rhythm without murmur.  Abdomen shows no masses tenderness or hepatosplenomegaly.  No pedal edema.  He ambulates with no hesitation or limp etc.  He is alert with clear speech.

## 2021-07-07 ENCOUNTER — COMMUNICATION - HEALTHEAST (OUTPATIENT)
Dept: FAMILY MEDICINE | Facility: CLINIC | Age: 51
End: 2021-07-07

## 2021-07-22 NOTE — LETTER
Letter by Dio Syed CNP at      Author: Dio Syed CNP Service: -- Author Type: --    Filed:  Encounter Date: 7/7/2021 Status: (Other)         Jean VALERIO [place] [address]             July 7, 2021         Dear Mr. Shankar,    Below are the results from your recent visit:    Resulted Orders   Lipid [place] FASTING   Result Value Ref Range    Cholesterol 209 (H) <=199 mg/dL    Triglycerides 64 <=149 mg/dL    HDL Cholesterol 53 >=40 mg/dL    LDL Calculated 143 (H) <=129 mg/dL    Patient Fasting > 8hrs? Yes    Comprehensive Metabolic Panel   Result Value Ref Range    Sodium 141 136 - 145 mmol/L    Potassium 4.5 3.5 - 5.0 mmol/L    Chloride 108 (H) 98 - 107 mmol/L    CO2 21 (L) 22 - 31 mmol/L    Anion Gap, Calculation 12 5 - 18 mmol/L    Glucose 101 70 - 125 mg/dL    BUN 13 8 - 22 mg/dL    Creatinine 0.89 0.70 - 1.30 mg/dL    GFR MDRD Af Amer >60 >60 mL/min/1.73m2    GFR MDRD Non Af Amer >60 >60 mL/min/1.73m2    Bilirubin, Total 0.6 0.0 - 1.0 mg/dL    Calcium 9.3 8.5 - 10.5 mg/dL    Protein, Total 6.5 6.0 - 8.0 g/dL    Albumin 4.1 3.5 - 5.0 g/dL    Alkaline Phosphatase 51 45 - 120 U/L    AST 27 0 - 40 U/L    ALT 36 0 - 45 U/L    Narrative    Fasting Glucose reference range is 70-99 mg/dL per  American Diabetes Association (ADA) guidelines.   Hepatitis C Antibody (Anti-HCV)   Result Value Ref Range    Hepatitis C Ab Negative Negative       Your once in a lifetime screen for hepatitis C is negative.  Kidney function, liver enzymes, electrolytes look good.  Your blood sugar is mildly elevated if you were fasting.  We like for it to be under 100.  We will just keep an eye on this.  Cholesterol levels also creeped up a little bit, but medication is not needed.  Make sure you are getting plenty of vegetables/fiber and limiting saturated fat/cholesterol in your diet.    Please call with questions or contact us using [employer]    Sincerely,         Dio Syed CNP

## 2021-09-02 DIAGNOSIS — B00.1 COLD SORE: Primary | ICD-10-CM

## 2021-09-03 RX ORDER — ACYCLOVIR 200 MG/1
CAPSULE ORAL
Qty: 30 CAPSULE | Refills: 0 | Status: SHIPPED | OUTPATIENT
Start: 2021-09-03

## 2021-09-03 NOTE — TELEPHONE ENCOUNTER
"Routing refill request to provider for review/approval because:  A break in medication    Last Written Prescription Date:  4/16/18  Last Fill Quantity: 30,  # refills: 1   Last office visit provider:  7/6/21     Requested Prescriptions   Pending Prescriptions Disp Refills     acyclovir (ZOVIRAX) 200 MG capsule [Pharmacy Med Name: Acyclovir 200 MG Oral Capsule] 30 capsule 0     Sig: TAKE 1 CAPSULE BY MOUTH UP TO FIVE TIMES DAILY FOR 5 DAYS AS NEEDED FOR  RECURRENT  COLD  SORES       Antivirals for Herpes Protocol Passed - 9/2/2021  4:51 PM        Passed - Patient is age 12 or older        Passed - Recent (12 mo) or future (30 days) visit within the authorizing provider's specialty     Patient has had an office visit with the authorizing provider or a provider within the authorizing providers department within the previous 12 mos or has a future within next 30 days. See \"Patient Info\" tab in inbasket, or \"Choose Columns\" in Meds & Orders section of the refill encounter.              Passed - Medication is active on med list        Passed - Normal serum creatinine on file in past 12 months     Recent Labs   Lab Test 07/06/21  0738   CR 0.89       Ok to refill medication if creatinine is low               Jesus Mills RN 09/03/21 1:14 PM  "

## 2022-01-18 ENCOUNTER — TELEPHONE (OUTPATIENT)
Dept: FAMILY MEDICINE | Facility: CLINIC | Age: 52
End: 2022-01-18
Payer: COMMERCIAL

## 2022-01-18 NOTE — TELEPHONE ENCOUNTER
Reason for Call:  Other appointment    Detailed comments: pt got a letter from [facility] stating that he was due for a colonoscopy and was just wanting to see if pcp felt like he needed to get it or not  Phone Number Patient can be reached at: Home number on file [phone] (home)    Best Time: anytime    Can we leave a detailed message on this number? YES    Call taken on 1/18/2022 at 4:08 PM by Lissy Palafox

## 2022-01-19 NOTE — TELEPHONE ENCOUNTER
With his history of advanced adenoma, he should get his repeat colonoscopy as its been 3 years.    Dio Syed NP

## 2022-04-13 ENCOUNTER — OFFICE VISIT (OUTPATIENT)
Dept: FAMILY MEDICINE | Facility: CLINIC | Age: 52
End: 2022-04-13
Payer: COMMERCIAL

## 2022-04-13 VITALS
WEIGHT: 197.9 LBS | BODY MASS INDEX: 26.23 KG/M2 | HEIGHT: 73 IN | HEART RATE: 61 BPM | DIASTOLIC BLOOD PRESSURE: 68 MMHG | SYSTOLIC BLOOD PRESSURE: 124 MMHG

## 2022-04-13 DIAGNOSIS — R68.84 JAW PAIN: Primary | ICD-10-CM

## 2022-04-13 PROCEDURE — 99213 OFFICE O/P EST LOW 20 MIN: CPT | Performed by: FAMILY MEDICINE

## 2022-04-13 ASSESSMENT — PATIENT HEALTH QUESTIONNAIRE - PHQ9
SUM OF ALL RESPONSES TO PHQ QUESTIONS 1-9: 11
SUM OF ALL RESPONSES TO PHQ QUESTIONS 1-9: 11
10. IF YOU CHECKED OFF ANY PROBLEMS, HOW DIFFICULT HAVE THESE PROBLEMS MADE IT FOR YOU TO DO YOUR WORK, TAKE CARE OF THINGS AT HOME, OR GET ALONG WITH OTHER PEOPLE: SOMEWHAT DIFFICULT

## 2022-04-13 NOTE — PROGRESS NOTES
ASSESSMENT:  (R66.13) Jaw pain  (primary encounter diagnosis)  Comment: Acute onset of left jaw pain, most likely from TMJ  Plan:        PLAN:  1.  I told the patient he can go ahead with a colonoscopy  2.  Jaw pain persists, patient should be reevaluated      No orders of the defined types were placed in this encounter.    There are no discontinued medications.    No follow-ups on file.    CHIEF COMPLAINT:  chief complaint    SUBJECTIVE:  Jean is a 51 year old male who comes in for about a 4 to 5-day history of left-sided jaw pain.  The patient is concerned because he is scheduled to have a colonoscopy in 2 days, he called up the gastroenterology folks who told him that he should be seen by his physician so he can be cleared or not cleared as it were for the colonoscopy.    Patient reports that the pain came on rather suddenly no injury to the area, sometimes it radiates into the ear.  I told the patient there is no evidence of an ear infection, I can reproduce some tenderness over the TMJ joint which I think is the source of his discomfort, this is not an infection and therefore the patient can go ahead with a colonoscopy.    Of note he is not feeling sick no fever chills no cough is not congested there is been no other change in his health status.      REVIEW OF SYSTEMS:      All other systems are negative.    PFSH:  Immunization History   Administered Date(s) Administered     COVID-19,PF,Alli 04/08/2021     COVID-19,PF,Pfizer (12+ Yrs) 12/09/2021     DT (PEDS <7y) 01/01/1998     Flu, Unspecified 10/21/2018     Influenza Quad, Recombinant, pf(RIV4) (Flublok) 10/13/2020     Influenza Vaccine IM > 6 months Valent IIV4 (Alfuria,Fluzone) 09/23/2019, 10/11/2021     Influenza Vaccine, 6+MO IM (QUADRIVALENT W/PRESERVATIVES) 09/25/2013     Td,adult,historic,unspecified 10/02/2000, 04/23/2009, 04/29/2009     Tdap (Adacel,Boostrix) 06/08/2016     Zoster vaccine recombinant adjuvanted (SHINGRIX) 07/30/2020, 11/03/2020      Social History     Socioeconomic History     Marital status:      Spouse name: Not on file     Number of children: Not on file     Years of education: Not on file     Highest education level: Not on file   Occupational History     Not on file   Tobacco Use     Smoking status: Former Smoker     Types: Cigarettes, Cigars, Cigarettes, Cigars     Quit date: 2000     Years since quittin.2     Smokeless tobacco: Former User   Substance and Sexual Activity     Alcohol use: Yes     Comment: Alcoholic Drinks/day: less than one beer per week     Drug use: No     Sexual activity: Not on file   Other Topics Concern     Not on file   Social History Narrative     Not on file     Social Determinants of Health     Financial Resource Strain: Not on file   Food Insecurity: Not on file   Transportation Needs: Not on file   Physical Activity: Not on file   Stress: Not on file   Social Connections: Not on file   Intimate Partner Violence: Not on file   Housing Stability: Not on file     No past medical history on file.  Family History   Problem Relation Age of Onset     Hyperlipidemia Mother      Cancer Father         prostate cancer       MEDICATIONS:  Current Outpatient Medications   Medication Sig Dispense Refill     acyclovir (ZOVIRAX) 200 MG capsule TAKE 1 CAPSULE BY MOUTH UP TO FIVE TIMES DAILY FOR 5 DAYS AS NEEDED FOR  RECURRENT  COLD  SORES 30 capsule 0     escitalopram oxalate (LEXAPRO) 10 MG tablet [ESCITALOPRAM OXALATE (LEXAPRO) 10 MG TABLET] Take 1 tablet (10 mg total) by mouth daily. 90 tablet 2     fish oil-omega-3 fatty acids (FISH OIL) 300-1,000 mg capsule [FISH OIL-OMEGA-3 FATTY ACIDS (FISH OIL) 300-1,000 MG CAPSULE] Take 2 g by mouth daily.       glucosamine-chondroitin 500-400 mg tablet [GLUCOSAMINE-CHONDROITIN 500-400 MG TABLET] Take 1 tablet by mouth 3 (three) times a day.       multivitamin therapeutic tablet [MULTIVITAMIN THERAPEUTIC TABLET] Take 1 tablet by mouth daily.       simvastatin  (ZOCOR) 20 MG tablet [SIMVASTATIN (ZOCOR) 20 MG TABLET] TAKE 1 TABLET BY MOUTH ONCE DAILY IN THE EVENING 90 tablet 2       TOBACCO USE:  History   Smoking Status     Former Smoker     Types: Cigarettes, Cigars, Cigarettes, Cigars     Quit date: 1/1/2000   Smokeless Tobacco     Former User       VITALS:  There were no vitals filed for this visit.  Wt Readings from Last 3 Encounters:   07/06/21 83 kg (183 lb)   07/30/20 91.6 kg (202 lb)   01/22/20 92.1 kg (203 lb)       PHYSICAL EXAM:  Constitutional:   Reveals a male who appears overall healthy.  Vitals: per nursing notes.  HEENT:  Ears:  External canals, TMs clear.    I can reproduce some tenderness along the left TMJ joint.  Of note the buccal mucosa is normal in appearance    Eyes:  EOMs full, PERRL.  Lungs: Clear to A&P without rales or wheezes.  Respiratory effort normal.  Cardiac:   Regular rate and rhythm, normal S1, S2, no murmur or gallop.  Musculoskeletal: No peripheral swelling.  Neuro:  Alert and oriented. Cranial nerves, motor, sensory exams are intact.  No gross focal deficits.  Psychiatric:  Memory intact, mood appropriate.    QUALITY MEASURES:      DATA REVIEWED:  Answers for HPI/ROS submitted by the patient on 4/13/2022  If you checked off any problems, how difficult have these problems made it for you to do your work, take care of things at home, or get along with other people?: Somewhat difficult  PHQ9 TOTAL SCORE: 11  How many servings of fruits and vegetables do you eat daily?: 2-3  On average, how many sweetened beverages do you drink each day (Examples: soda, juice, sweet tea, etc.  Do NOT count diet or artificially sweetened beverages)?: 1  How many minutes a day do you exercise enough to make your heart beat faster?: 30 to 60  How many days a week do you exercise enough to make your heart beat faster?: 5  How many days per week do you miss taking your medication?: 0  What is the reason for your visit today?: Sore ear and jaw on left  side  When did your symptoms begin?: 3-7 days ago

## 2022-04-14 ASSESSMENT — PATIENT HEALTH QUESTIONNAIRE - PHQ9: SUM OF ALL RESPONSES TO PHQ QUESTIONS 1-9: 11

## 2022-04-15 ENCOUNTER — TRANSFERRED RECORDS (OUTPATIENT)
Dept: HEALTH INFORMATION MANAGEMENT | Facility: CLINIC | Age: 52
End: 2022-04-15
Payer: COMMERCIAL

## 2022-04-19 DIAGNOSIS — F41.9 ANXIETY: ICD-10-CM

## 2022-04-19 DIAGNOSIS — E78.2 MIXED HYPERLIPIDEMIA: ICD-10-CM

## 2022-04-21 RX ORDER — ESCITALOPRAM OXALATE 10 MG/1
TABLET ORAL
Qty: 90 TABLET | Refills: 3 | Status: SHIPPED | OUTPATIENT
Start: 2022-04-21 | End: 2023-05-21

## 2022-04-21 RX ORDER — SIMVASTATIN 20 MG
TABLET ORAL
Qty: 90 TABLET | Refills: 0 | Status: SHIPPED | OUTPATIENT
Start: 2022-04-21 | End: 2022-04-27

## 2022-04-22 NOTE — TELEPHONE ENCOUNTER
"Last Written Prescription Date:  7/6/2021  Last Fill Quantity: 90,  # refills: 2   Last office visit provider:  4/13/2022     Requested Prescriptions   Pending Prescriptions Disp Refills     simvastatin (ZOCOR) 20 MG tablet [Pharmacy Med Name: Simvastatin 20 MG Oral Tablet] 90 tablet 0     Sig: TAKE 1 TABLET BY MOUTH ONCE DAILY IN THE EVENING       Statins Protocol Passed - 4/19/2022  5:19 PM        Passed - LDL on file in past 12 months     Recent Labs   Lab Test 07/06/21  0738   *             Passed - No abnormal creatine kinase in past 12 months     No lab results found.             Passed - Recent (12 mo) or future (30 days) visit within the authorizing provider's specialty     Patient has had an office visit with the authorizing provider or a provider within the authorizing providers department within the previous 12 mos or has a future within next 30 days. See \"Patient Info\" tab in inbasket, or \"Choose Columns\" in Meds & Orders section of the refill encounter.              Passed - Medication is active on med list        Passed - Patient is age 18 or older        Last Written Prescription Date:  7/6/2021  Last Fill Quantity: 90,  # refills: 2        escitalopram (LEXAPRO) 10 MG tablet [Pharmacy Med Name: Escitalopram Oxalate 10 MG Oral Tablet] 30 tablet 0     Sig: Take 1 tablet by mouth once daily       SSRIs Protocol Passed - 4/19/2022  5:19 PM        Passed - Recent (12 mo) or future (30 days) visit within the authorizing provider's specialty     Patient has had an office visit with the authorizing provider or a provider within the authorizing providers department within the previous 12 mos or has a future within next 30 days. See \"Patient Info\" tab in inbasket, or \"Choose Columns\" in Meds & Orders section of the refill encounter.              Passed - Medication is active on med list        Passed - Patient is age 18 or older             Jewels Root RN 04/21/22 7:19 PM  "

## 2022-04-27 ENCOUNTER — OFFICE VISIT (OUTPATIENT)
Dept: FAMILY MEDICINE | Facility: CLINIC | Age: 52
End: 2022-04-27
Payer: COMMERCIAL

## 2022-04-27 VITALS
BODY MASS INDEX: 26.49 KG/M2 | WEIGHT: 200.8 LBS | SYSTOLIC BLOOD PRESSURE: 116 MMHG | HEART RATE: 65 BPM | DIASTOLIC BLOOD PRESSURE: 64 MMHG | OXYGEN SATURATION: 97 %

## 2022-04-27 DIAGNOSIS — E78.2 MIXED HYPERLIPIDEMIA: Primary | ICD-10-CM

## 2022-04-27 DIAGNOSIS — F41.9 ANXIETY: ICD-10-CM

## 2022-04-27 DIAGNOSIS — Z12.5 SCREENING FOR PROSTATE CANCER: ICD-10-CM

## 2022-04-27 LAB
ALBUMIN SERPL-MCNC: 4.1 G/DL (ref 3.5–5)
ALP SERPL-CCNC: 59 U/L (ref 45–120)
ALT SERPL W P-5'-P-CCNC: 34 U/L (ref 0–45)
ANION GAP SERPL CALCULATED.3IONS-SCNC: 10 MMOL/L (ref 5–18)
AST SERPL W P-5'-P-CCNC: 22 U/L (ref 0–40)
BILIRUB SERPL-MCNC: 0.5 MG/DL (ref 0–1)
BUN SERPL-MCNC: 15 MG/DL (ref 8–22)
CALCIUM SERPL-MCNC: 9.6 MG/DL (ref 8.5–10.5)
CHLORIDE BLD-SCNC: 105 MMOL/L (ref 98–107)
CHOLEST SERPL-MCNC: 213 MG/DL
CO2 SERPL-SCNC: 25 MMOL/L (ref 22–31)
CREAT SERPL-MCNC: 0.99 MG/DL (ref 0.7–1.3)
FASTING STATUS PATIENT QL REPORTED: YES
GFR SERPL CREATININE-BSD FRML MDRD: >90 ML/MIN/1.73M2
GLUCOSE BLD-MCNC: 101 MG/DL (ref 70–125)
HDLC SERPL-MCNC: 53 MG/DL
LDLC SERPL CALC-MCNC: 136 MG/DL
POTASSIUM BLD-SCNC: 4.1 MMOL/L (ref 3.5–5)
PROT SERPL-MCNC: 6.9 G/DL (ref 6–8)
PSA SERPL-MCNC: 1.75 UG/L (ref 0–3.5)
SODIUM SERPL-SCNC: 140 MMOL/L (ref 136–145)
TRIGL SERPL-MCNC: 121 MG/DL

## 2022-04-27 PROCEDURE — G0103 PSA SCREENING: HCPCS | Performed by: NURSE PRACTITIONER

## 2022-04-27 PROCEDURE — 99214 OFFICE O/P EST MOD 30 MIN: CPT | Performed by: NURSE PRACTITIONER

## 2022-04-27 PROCEDURE — 80061 LIPID PANEL: CPT | Performed by: NURSE PRACTITIONER

## 2022-04-27 PROCEDURE — 36415 COLL VENOUS BLD VENIPUNCTURE: CPT | Performed by: NURSE PRACTITIONER

## 2022-04-27 PROCEDURE — 80053 COMPREHEN METABOLIC PANEL: CPT | Performed by: NURSE PRACTITIONER

## 2022-04-27 RX ORDER — SIMVASTATIN 20 MG
TABLET ORAL
Qty: 90 TABLET | Refills: 3 | Status: SHIPPED | OUTPATIENT
Start: 2022-04-27 | End: 2023-05-22

## 2022-04-27 ASSESSMENT — PATIENT HEALTH QUESTIONNAIRE - PHQ9
SUM OF ALL RESPONSES TO PHQ QUESTIONS 1-9: 8
SUM OF ALL RESPONSES TO PHQ QUESTIONS 1-9: 8

## 2022-04-27 NOTE — PATIENT INSTRUCTIONS
I'll keep my eyes open for the colonoscopy report.    I have made sure that you have refills for the year on both your medications.    Updating lab work today.    If anything changes or worsens over the next year, let me know.

## 2022-04-27 NOTE — PROGRESS NOTES
Assessment & Plan     ICD-10-CM    1. Mixed hyperlipidemia  E78.2 Comprehensive metabolic panel (BMP + Alb, Alk Phos, ALT, AST, Total. Bili, TP)     Lipid panel     simvastatin (ZOCOR) 20 MG tablet     Comprehensive metabolic panel (BMP + Alb, Alk Phos, ALT, AST, Total. Bili, TP)     Lipid panel   2. Anxiety  F41.9    3. Screening for prostate cancer  Z12.5 PSA, screen     PSA, screen     Update labs.Mildly elevated phq, but he notes that he feels mood is under control. Continue same lexapro. Update prostate cancer screen.     Subjective     HPI     Answers for HPI/ROS submitted by the patient on 4/27/2022  Are you regularly taking any medication or supplement to lower your cholesterol?: Yes  Are you having muscle aches or other side effects that you think could be caused by your cholesterol lowering medication?: No  PHQ9 TOTAL SCORE: 8  How many servings of fruits and vegetables do you eat daily?: 2-3  On average, how many sweetened beverages do you drink each day (Examples: soda, juice, sweet tea, etc.  Do NOT count diet or artificially sweetened beverages)?: 0  How many minutes a day do you exercise enough to make your heart beat faster?: 30 to 60  How many days a week do you exercise enough to make your heart beat faster?: 3 or less  How many days per week do you miss taking your medication?: 0      PHQ 7/6/2021 4/13/2022 4/27/2022   PHQ-9 Total Score 3 11 8   Q9: Thoughts of better off dead/self-harm past 2 weeks Several days Several days Several days   F/U: Thoughts of suicide or self-harm - Yes Yes   F/U: Self harm-plan - Yes No   F/U: Self-harm action - No No   F/U: Safety concerns - No No     Mood hasn't been worsening since I last saw him. He reports that he feels like mood is under good control with current lexapro.     On simvastatin. Tolerating well. No myalgias.     Did have colonoscopy on April 15th at [facility] with a couple small polyps and recommendations to repeat in 5 years.       Review of Systems -  negative except for what's listed in the HPI      Objective    /64   Pulse 65   Wt 91.1 kg (200 lb 12.8 oz)   SpO2 97%   BMI 26.49 kg/m    Physical Exam   General appearance - alert, well appearing, and in no distress  Mental status - alert, oriented to person, place, and time  Eyes -sclera white  Lymphatics - no palpable lymphadenopathy  Chest - clear to auscultation, no wheezes, rales or rhonchi, symmetric air entry  Heart - normal rate and regular rhythm, S1 and S2 normal, no murmurs noted  Abdomen - soft, nontender, nondistended, no masses or organomegaly  Neurological - alert, oriented, normal speech, no focal findings or movement disorder noted, neck supple without rigidity, cranial nerves II through XII intact, motor and sensory grossly normal bilaterally, normal muscle tone, no tremors, strength 5/5  Extremities - peripheral pulses normal, no peripheral edema  Skin - normal coloration and turgor.    Dio Syed, CNP    This note has been dictated using voice recognition software. Any grammatical or context distortions are unintentional and inherent to the software.

## 2022-04-27 NOTE — LETTER
April 29, 2022      Jean Shankar  [address] [place] [address]        Dear ,    We are writing to inform you of your test results.    Electrolytes, kidney function, and liver looks okay.  Prostate cancer screen looks fine.  Cholesterol levels are very mildly elevated but since you are already on simvastatin, this is acceptable given your health history      Resulted Orders   Comprehensive metabolic panel (BMP + Alb, Alk Phos, ALT, AST, Total. Bili, TP)   Result Value Ref Range    Sodium 140 136 - 145 mmol/L    Potassium 4.1 3.5 - 5.0 mmol/L    Chloride 105 98 - 107 mmol/L    Carbon Dioxide (CO2) 25 22 - 31 mmol/L    Anion Gap 10 5 - 18 mmol/L    Urea Nitrogen 15 8 - 22 mg/dL    Creatinine 0.99 0.70 - 1.30 mg/dL    Calcium 9.6 8.5 - 10.5 mg/dL    Glucose 101 70 - 125 mg/dL    Alkaline Phosphatase 59 45 - 120 U/L    AST 22 0 - 40 U/L    ALT 34 0 - 45 U/L    Protein Total 6.9 6.0 - 8.0 g/dL    Albumin 4.1 3.5 - 5.0 g/dL    Bilirubin Total 0.5 0.0 - 1.0 mg/dL    GFR Estimate >90 >60 mL/min/1.73m2      Comment:      Effective December 21, 2021 eGFRcr in adults is calculated using the 2021 CKD-EPI creatinine equation which includes age and gender (Guillermina dinero al., NEJ, DOI: 10.1056/NKPUug3099840)   PSA, screen   Result Value Ref Range    Prostate Specific Antigen Screen 1.75 0.00 - 3.50 ug/L    Narrative    Assay Method is Abbott Prostate-Specific Antigen (PSA)  Standard-WHO 1st International (90:10)   Lipid panel   Result Value Ref Range    Cholesterol 213 (H) <=199 mg/dL    Triglycerides 121 <=149 mg/dL    Direct Measure HDL 53 >=40 mg/dL      Comment:      HDL Cholesterol Reference Range:     0-2 years:   No reference ranges established for patients under 2 years old  [facility] lipid analytes.    2-8 years:  Greater than 45 mg/dL     18 years and older:   Female: Greater than or equal to 50 mg/dL   Male:   Greater than or equal to 40 mg/dL    LDL Cholesterol Calculated 136 (H)  <=129 mg/dL    Patient Fasting > 8hrs? Yes        If you have any questions or concerns, please call the clinic at the number listed above.       Sincerely,      Dio Syed NP

## 2022-04-28 ASSESSMENT — PATIENT HEALTH QUESTIONNAIRE - PHQ9: SUM OF ALL RESPONSES TO PHQ QUESTIONS 1-9: 8

## 2022-08-29 ENCOUNTER — TELEPHONE (OUTPATIENT)
Dept: NURSING | Facility: CLINIC | Age: 52
End: 2022-08-29

## 2022-08-29 NOTE — TELEPHONE ENCOUNTER
Nurse Triage SBAR    Is this a 2nd Level Triage? NO    Situation: Patient tested positive for COVID today, wanted to update his PCP.     Background: Patient reporting body aches but no other symptoms. Patient is not concerned at this time, declined treatment options. Will lay low at home and call if concerning symptoms arise.    Assessment: COVID positive    Protocol Recommended Disposition:   No disposition on file.    Recommendation: Nothing, FYI only     Routed to provider    Does the patient meet one of the following criteria for ADS visit consideration? No

## 2022-09-07 ENCOUNTER — TELEPHONE (OUTPATIENT)
Dept: FAMILY MEDICINE | Facility: CLINIC | Age: 52
End: 2022-09-07

## 2022-09-07 NOTE — TELEPHONE ENCOUNTER
Forms Request  Name of form/paperwork: covid paperwork  Have you been seen for this request: N/A  Do we have the form: yes, in providers inbox  When is form needed by: when done  How would you like the form returned: mail via enclosed envelope  Patient Notified form requests are processed in 3-5 business days: n/a    Okay to leave a detailed message? n/a

## 2022-09-08 NOTE — TELEPHONE ENCOUNTER
Given to Vi to call patient to set up phone visit today at 4 or over lunch tomorrow.    Dio Syed, CNP

## 2022-09-09 ENCOUNTER — VIRTUAL VISIT (OUTPATIENT)
Dept: FAMILY MEDICINE | Facility: CLINIC | Age: 52
End: 2022-09-09
Payer: COMMERCIAL

## 2022-09-09 DIAGNOSIS — U07.1 INFECTION DUE TO 2019 NOVEL CORONAVIRUS: Primary | ICD-10-CM

## 2022-09-09 PROCEDURE — 99207 PR NON-BILLABLE SERV PER CHARTING: CPT | Performed by: NURSE PRACTITIONER

## 2022-09-09 NOTE — PROGRESS NOTES
"Jean is a 51 year old who is being evaluated via a billable video visit.      How would you like to obtain your AVS? MyChart  If the video visit is dropped, the invitation should be resent by: Text to cell phone: [phone]  Will anyone else be joining your video visit? No        Assessment & Plan     Infection due to 2019 novel coronavirus  Doing well.  Questions answered and paperwork filled out and given to clinical assistant to send off.       BMI:   Estimated body mass index is 26.49 kg/m  as calculated from the following:    Height as of 4/13/22: 1.854 m (6' 1\").    Weight as of 4/27/22: 91.1 kg (200 lb 12.8 oz).   No follow-ups on file.    Dio Syed NP  [facility]    Micheal Pena is a 51 year old presenting for the following health issues:  No chief complaint on file.      HPI     Patient suffered from COVID.  Bounced back after a short amount of time.  Actually is back to work now but needs paperwork filled out for his employer.    Review of Systems   Constitutional, HEENT, cardiovascular, pulmonary, gi and gu systems are negative, except as otherwise noted.      Objective           Vitals:  No vitals were obtained today due to virtual visit.    Physical Exam   GENERAL:no distress  RESP: No audible wheeze, cough,   PSYCH: Mentation appears normal, affect normal/bright, judgement and insight intact, normal speech and appearance well-groomed.        Video-Visit Details    This was transitioned to a telephone visit.    Total time talking with patient over the phone 4 minutes.    Dio Syed CNP    "

## 2023-05-21 DIAGNOSIS — F41.9 ANXIETY: ICD-10-CM

## 2023-05-21 DIAGNOSIS — E78.2 MIXED HYPERLIPIDEMIA: ICD-10-CM

## 2023-05-21 RX ORDER — ESCITALOPRAM OXALATE 10 MG/1
TABLET ORAL
Qty: 90 TABLET | Refills: 1 | Status: SHIPPED | OUTPATIENT
Start: 2023-05-21 | End: 2023-06-27

## 2023-05-21 NOTE — TELEPHONE ENCOUNTER
"Routing refill request to provider for review/approval because:  Labs out of range:  LDL  Labs not current:  LDL    Last Written Prescription Date:  4/27/22  Last Fill Quantity: 90,  # refills: 3   Last office visit provider:  9/9/22     Requested Prescriptions   Pending Prescriptions Disp Refills     simvastatin (ZOCOR) 20 MG tablet [Pharmacy Med Name: Simvastatin 20 MG Oral Tablet] 30 tablet 0     Sig: TAKE 1 TABLET BY MOUTH ONCE DAILY IN THE EVENING       Statins Protocol Failed - 5/21/2023 11:54 AM        Failed - LDL on file in past 12 months     Recent Labs   Lab Test 04/27/22  0728   *             Passed - No abnormal creatine kinase in past 12 months     No lab results found.             Passed - Recent (12 mo) or future (30 days) visit within the authorizing provider's specialty     Patient has had an office visit with the authorizing provider or a provider within the authorizing providers department within the previous 12 mos or has a future within next 30 days. See \"Patient Info\" tab in inbasket, or \"Choose Columns\" in Meds & Orders section of the refill encounter.              Passed - Medication is active on med list        Passed - Patient is age 18 or older         Signed Prescriptions Disp Refills    escitalopram (LEXAPRO) 10 MG tablet 90 tablet 1     Sig: Take 1 tablet by mouth once daily       SSRIs Protocol Passed - 5/21/2023 11:54 AM        Passed - Recent (12 mo) or future (30 days) visit within the authorizing provider's specialty     Patient has had an office visit with the authorizing provider or a provider within the authorizing providers department within the previous 12 mos or has a future within next 30 days. See \"Patient Info\" tab in inbasket, or \"Choose Columns\" in Meds & Orders section of the refill encounter.              Passed - Medication is active on med list        Passed - Patient is age 18 or older             Precious Hernandez 05/21/23 12:45 PM  "

## 2023-05-21 NOTE — TELEPHONE ENCOUNTER
"Last Written Prescription Date:  4/21/22  Last Fill Quantity: 90,  # refills: 3   Last office visit provider:  9/9/22     Requested Prescriptions   Pending Prescriptions Disp Refills     simvastatin (ZOCOR) 20 MG tablet [Pharmacy Med Name: Simvastatin 20 MG Oral Tablet] 30 tablet 0     Sig: TAKE 1 TABLET BY MOUTH ONCE DAILY IN THE EVENING       Statins Protocol Failed - 5/21/2023 11:54 AM        Failed - LDL on file in past 12 months     Recent Labs   Lab Test 04/27/22  0728   *             Passed - No abnormal creatine kinase in past 12 months     No lab results found.             Passed - Recent (12 mo) or future (30 days) visit within the authorizing provider's specialty     Patient has had an office visit with the authorizing provider or a provider within the authorizing providers department within the previous 12 mos or has a future within next 30 days. See \"Patient Info\" tab in inbasket, or \"Choose Columns\" in Meds & Orders section of the refill encounter.              Passed - Medication is active on med list        Passed - Patient is age 18 or older           escitalopram (LEXAPRO) 10 MG tablet [Pharmacy Med Name: Escitalopram Oxalate 10 MG Oral Tablet] 30 tablet 0     Sig: Take 1 tablet by mouth once daily       SSRIs Protocol Passed - 5/21/2023 11:54 AM        Passed - Recent (12 mo) or future (30 days) visit within the authorizing provider's specialty     Patient has had an office visit with the authorizing provider or a provider within the authorizing providers department within the previous 12 mos or has a future within next 30 days. See \"Patient Info\" tab in inbasket, or \"Choose Columns\" in Meds & Orders section of the refill encounter.              Passed - Medication is active on med list        Passed - Patient is age 18 or older             Precious Hernandez 05/21/23 12:43 PM  "

## 2023-05-22 RX ORDER — SIMVASTATIN 20 MG
TABLET ORAL
Qty: 90 TABLET | Refills: 0 | Status: SHIPPED | OUTPATIENT
Start: 2023-05-22 | End: 2023-06-27

## 2023-06-27 ENCOUNTER — OFFICE VISIT (OUTPATIENT)
Dept: FAMILY MEDICINE | Facility: CLINIC | Age: 53
End: 2023-06-27
Payer: COMMERCIAL

## 2023-06-27 VITALS
HEIGHT: 74 IN | WEIGHT: 204 LBS | HEART RATE: 60 BPM | BODY MASS INDEX: 26.18 KG/M2 | TEMPERATURE: 98.4 F | SYSTOLIC BLOOD PRESSURE: 128 MMHG | DIASTOLIC BLOOD PRESSURE: 86 MMHG | OXYGEN SATURATION: 96 % | RESPIRATION RATE: 16 BRPM

## 2023-06-27 DIAGNOSIS — F41.9 ANXIETY: ICD-10-CM

## 2023-06-27 DIAGNOSIS — Z00.00 ROUTINE GENERAL MEDICAL EXAMINATION AT A HEALTH CARE FACILITY: Primary | ICD-10-CM

## 2023-06-27 DIAGNOSIS — E78.2 MIXED HYPERLIPIDEMIA: ICD-10-CM

## 2023-06-27 DIAGNOSIS — Z12.5 SCREENING FOR PROSTATE CANCER: ICD-10-CM

## 2023-06-27 LAB
ALBUMIN SERPL BCG-MCNC: 4.4 G/DL (ref 3.5–5.2)
ALP SERPL-CCNC: 47 U/L (ref 40–129)
ALT SERPL W P-5'-P-CCNC: 24 U/L (ref 0–70)
ANION GAP SERPL CALCULATED.3IONS-SCNC: 10 MMOL/L (ref 7–15)
AST SERPL W P-5'-P-CCNC: 24 U/L (ref 0–45)
BILIRUB SERPL-MCNC: 0.5 MG/DL
BUN SERPL-MCNC: 15.1 MG/DL (ref 6–20)
CALCIUM SERPL-MCNC: 9.4 MG/DL (ref 8.6–10)
CHLORIDE SERPL-SCNC: 108 MMOL/L (ref 98–107)
CHOLEST SERPL-MCNC: 199 MG/DL
CREAT SERPL-MCNC: 0.97 MG/DL (ref 0.67–1.17)
DEPRECATED HCO3 PLAS-SCNC: 24 MMOL/L (ref 22–29)
GFR SERPL CREATININE-BSD FRML MDRD: >90 ML/MIN/1.73M2
GLUCOSE SERPL-MCNC: 108 MG/DL (ref 70–99)
HDLC SERPL-MCNC: 45 MG/DL
LDLC SERPL CALC-MCNC: 131 MG/DL
NONHDLC SERPL-MCNC: 154 MG/DL
POTASSIUM SERPL-SCNC: 4.3 MMOL/L (ref 3.4–5.3)
PROT SERPL-MCNC: 6.8 G/DL (ref 6.4–8.3)
PSA SERPL DL<=0.01 NG/ML-MCNC: 1.57 NG/ML (ref 0–3.5)
SODIUM SERPL-SCNC: 142 MMOL/L (ref 136–145)
TRIGL SERPL-MCNC: 113 MG/DL

## 2023-06-27 PROCEDURE — 80061 LIPID PANEL: CPT | Performed by: NURSE PRACTITIONER

## 2023-06-27 PROCEDURE — 99396 PREV VISIT EST AGE 40-64: CPT | Mod: 25 | Performed by: NURSE PRACTITIONER

## 2023-06-27 PROCEDURE — 36415 COLL VENOUS BLD VENIPUNCTURE: CPT | Performed by: NURSE PRACTITIONER

## 2023-06-27 PROCEDURE — 91312 COVID-19 BIVALENT 12+ (PFIZER): CPT | Performed by: NURSE PRACTITIONER

## 2023-06-27 PROCEDURE — 0121A COVID-19 BIVALENT 12+ (PFIZER): CPT | Performed by: NURSE PRACTITIONER

## 2023-06-27 PROCEDURE — 80053 COMPREHEN METABOLIC PANEL: CPT | Performed by: NURSE PRACTITIONER

## 2023-06-27 PROCEDURE — G0103 PSA SCREENING: HCPCS | Performed by: NURSE PRACTITIONER

## 2023-06-27 RX ORDER — SIMVASTATIN 20 MG
20 TABLET ORAL EVERY EVENING
Qty: 90 TABLET | Refills: 3 | Status: SHIPPED | OUTPATIENT
Start: 2023-06-27 | End: 2024-06-04

## 2023-06-27 RX ORDER — ESCITALOPRAM OXALATE 10 MG/1
10 TABLET ORAL DAILY
Qty: 90 TABLET | Refills: 3 | Status: SHIPPED | OUTPATIENT
Start: 2023-06-27 | End: 2024-06-04

## 2023-06-27 ASSESSMENT — ANXIETY QUESTIONNAIRES
7. FEELING AFRAID AS IF SOMETHING AWFUL MIGHT HAPPEN: SEVERAL DAYS
6. BECOMING EASILY ANNOYED OR IRRITABLE: MORE THAN HALF THE DAYS
IF YOU CHECKED OFF ANY PROBLEMS ON THIS QUESTIONNAIRE, HOW DIFFICULT HAVE THESE PROBLEMS MADE IT FOR YOU TO DO YOUR WORK, TAKE CARE OF THINGS AT HOME, OR GET ALONG WITH OTHER PEOPLE: NOT DIFFICULT AT ALL
3. WORRYING TOO MUCH ABOUT DIFFERENT THINGS: NOT AT ALL
7. FEELING AFRAID AS IF SOMETHING AWFUL MIGHT HAPPEN: SEVERAL DAYS
8. IF YOU CHECKED OFF ANY PROBLEMS, HOW DIFFICULT HAVE THESE MADE IT FOR YOU TO DO YOUR WORK, TAKE CARE OF THINGS AT HOME, OR GET ALONG WITH OTHER PEOPLE?: NOT DIFFICULT AT ALL
GAD7 TOTAL SCORE: 5
4. TROUBLE RELAXING: NOT AT ALL
2. NOT BEING ABLE TO STOP OR CONTROL WORRYING: NOT AT ALL
1. FEELING NERVOUS, ANXIOUS, OR ON EDGE: MORE THAN HALF THE DAYS
GAD7 TOTAL SCORE: 5
5. BEING SO RESTLESS THAT IT IS HARD TO SIT STILL: NOT AT ALL
GAD7 TOTAL SCORE: 5

## 2023-06-27 ASSESSMENT — PATIENT HEALTH QUESTIONNAIRE - PHQ9
SUM OF ALL RESPONSES TO PHQ QUESTIONS 1-9: 8
SUM OF ALL RESPONSES TO PHQ QUESTIONS 1-9: 8
10. IF YOU CHECKED OFF ANY PROBLEMS, HOW DIFFICULT HAVE THESE PROBLEMS MADE IT FOR YOU TO DO YOUR WORK, TAKE CARE OF THINGS AT HOME, OR GET ALONG WITH OTHER PEOPLE: SOMEWHAT DIFFICULT

## 2023-06-27 ASSESSMENT — ENCOUNTER SYMPTOMS
HEMATURIA: 0
HEARTBURN: 0
CHILLS: 0
PALPITATIONS: 0
WEAKNESS: 0
DIARRHEA: 0
ARTHRALGIAS: 0
DYSURIA: 0
FREQUENCY: 0
NAUSEA: 0
MYALGIAS: 0
PARESTHESIAS: 0
DIZZINESS: 0
HEMATOCHEZIA: 0
ABDOMINAL PAIN: 0
NERVOUS/ANXIOUS: 0
SORE THROAT: 0
COUGH: 0
CONSTIPATION: 0
SHORTNESS OF BREATH: 0
FEVER: 0
HEADACHES: 0
JOINT SWELLING: 0
EYE PAIN: 0

## 2023-06-27 ASSESSMENT — PAIN SCALES - GENERAL: PAINLEVEL: NO PAIN (0)

## 2023-06-27 NOTE — PROGRESS NOTES
SUBJECTIVE:   CC: Jean is an 52 year old who presents for preventative health visit.       2023     6:52 AM   Additional Questions   Roomed by severo russo stevenson     Is under a ton of stress with layoffs happening at work.  Personally he feels like his anxiety and stress are under good control. Talking with an employee assistance counselor through his employer.      Healthy Habits:     Getting at least 3 servings of Calcium per day:  NO    Bi-annual eye exam:  Yes    Dental care twice a year:  Yes    Sleep apnea or symptoms of sleep apnea:  None    Diet:  Other    Frequency of exercise:  1 day/week    Duration of exercise:  Less than 15 minutes    Taking medications regularly:  Yes    Medication side effects:  Other    PHQ-2 Total Score: 2    Additional concerns today:  No  }    Social History     Tobacco Use     Smoking status: Former     Types: Cigarettes, Cigars     Quit date: 2000     Years since quittin.5     Smokeless tobacco: Former   Substance Use Topics     Alcohol use: Yes     Comment: Alcoholic Drinks/day: less than one beer per week             2023     6:51 AM   Alcohol Use   Prescreen: >3 drinks/day or >7 drinks/week? Not Applicable       Last PSA:   Prostate Specific Antigen Screen   Date Value Ref Range Status   2022 1.75 0.00 - 3.50 ug/L Final       Reviewed orders with patient. Reviewed health maintenance and updated orders accordingly - Yes  Lab work is in process    Reviewed and updated as needed this visit by clinical staff   Tobacco  Allergies  Meds    Surg Hx         Reviewed and updated as needed this visit by Provider    Review of Systems   Constitutional: Negative for chills and fever.   HENT: Negative for congestion, ear pain, hearing loss and sore throat.    Eyes: Negative for pain and visual disturbance.   Respiratory: Negative for cough and shortness of breath.    Cardiovascular: Negative for chest pain, palpitations and peripheral edema.   Gastrointestinal:  "Negative for abdominal pain, constipation, diarrhea, heartburn, hematochezia and nausea.   Genitourinary: Negative for dysuria, frequency, genital sores, hematuria, impotence, penile discharge and urgency.   Musculoskeletal: Negative for arthralgias, joint swelling and myalgias.   Skin: Negative for rash.   Neurological: Negative for dizziness, weakness, headaches and paresthesias.   Psychiatric/Behavioral: Negative for mood changes. The patient is not nervous/anxious.      OBJECTIVE:   /86   Pulse 60   Temp 98.4  F (36.9  C)   Resp 16   Ht 1.88 m (6' 2\")   Wt 92.5 kg (204 lb)   SpO2 96%   BMI 26.19 kg/m      Physical Exam  GENERAL: healthy, alert and no distress  EYES: Eyes grossly normal to inspection, PERRL and conjunctivae and sclerae normal  HENT: ear canals and TM's normal, nose and mouth without ulcers or lesions  NECK: no adenopathy, no asymmetry, masses, or scars and thyroid normal to palpation  RESP: lungs clear to auscultation - no rales, rhonchi or wheezes  CV: regular rate and rhythm, normal S1 S2, no S3 or S4, no murmur, click or rub, no peripheral edema and peripheral pulses strong  ABDOMEN: soft, nontender, no hepatosplenomegaly, no masses and bowel sounds normal  MS: no gross musculoskeletal defects noted, no edema  SKIN: no suspicious lesions or rashes  NEURO: Normal strength and tone, mentation intact and speech normal  PSYCH: mentation appears normal, affect normal/bright    Diagnostic Test Results:  Labs reviewed in Epic    ASSESSMENT/PLAN:       ICD-10-CM    1. Routine general medical examination at a health care facility  Z00.00       2. Mixed hyperlipidemia  E78.2      Comprehensive metabolic panel (BMP + Alb, Alk Phos, ALT, AST, Total. Bili, TP)     Lipid panel      3. Screening for prostate cancer  Z12.5 PSA, screen     Doing well.  Denies any major anxiety or depressive issues despite PHQ scores.  Is under situational stress through work but is getting counseling services.  " "Declines need for intervention today.  COVID booster given.  Update screening labs.  Colonoscopy due in 2027.          COUNSELING:   Reviewed preventive health counseling, as reflected in patient instructions      BMI:   Estimated body mass index is 26.19 kg/m  as calculated from the following:    Height as of this encounter: 1.88 m (6' 2\").    Weight as of this encounter: 92.5 kg (204 lb).         He reports that he quit smoking about 23 years ago. His smoking use included cigarettes, cigars, cigarettes, and cigars. He has quit using smokeless tobacco.      Dio Syed NP  [facility]  Answers for HPI/ROS submitted by the patient on 6/27/2023  If you checked off any problems, how difficult have these problems made it for you to do your work, take care of things at home, or get along with other people?: Somewhat difficult  PHQ9 TOTAL SCORE: 8  ED 7 TOTAL SCORE: 5      "

## 2023-06-27 NOTE — LETTER
"June 29, 2023      Jean [place] [facility] [id number]        Dear ,    Kidneys, liver, and electrolytes look good.       Blood sugar if fasting is mildly elevated but if nonfasting is considered normal.       Cholesterol levels look a little better compared to a year ago.  That LDL which is your \"bad cholesterol \"is still mildly elevated and can be improved with getting 25-30 g of fiber daily and limiting cholesterol intake to 200 mg a day or less.     Resulted Orders   Comprehensive metabolic panel (BMP + Alb, Alk Phos, ALT, AST, Total. Bili, TP)   Result Value Ref Range    Sodium 142 136 - 145 mmol/L    Potassium 4.3 3.4 - 5.3 mmol/L    Chloride 108 (H) 98 - 107 mmol/L    Carbon Dioxide (CO2) 24 22 - 29 mmol/L    Anion Gap 10 7 - 15 mmol/L    Urea Nitrogen 15.1 6.0 - 20.0 mg/dL    Creatinine 0.97 0.67 - 1.17 mg/dL    Calcium 9.4 8.6 - 10.0 mg/dL    Glucose 108 (H) 70 - 99 mg/dL    Alkaline Phosphatase 47 40 - 129 U/L    AST 24 0 - 45 U/L      Comment:      Reference intervals for this test were updated on 6/12/2023 to more accurately reflect our healthy population. There may be differences in the flagging of prior results with similar values performed with this method. Interpretation of those prior results can be made in the context of the updated reference intervals.    ALT 24 0 - 70 U/L      Comment:      Reference intervals for this test were updated on 6/12/2023 to more accurately reflect our healthy population. There may be differences in the flagging of prior results with similar values performed with this method. Interpretation of those prior results can be made in the context of the updated reference intervals.      Protein Total 6.8 6.4 - 8.3 g/dL    Albumin 4.4 3.5 - 5.2 g/dL    Bilirubin Total 0.5 <=1.2 mg/dL    GFR Estimate >90 >60 mL/min/1.73m2   Lipid panel   Result Value Ref Range    Cholesterol 199 <200 mg/dL    Triglycerides 113 <150 mg/dL    Direct Measure HDL 45 >=40 " mg/dL    LDL Cholesterol Calculated 131 (H) <=100 mg/dL    Non HDL Cholesterol 154 (H) <130 mg/dL    Narrative    Cholesterol  Desirable:  <200 mg/dL    Triglycerides  Normal:  Less than 150 mg/dL  Borderline High:  150-199 mg/dL  High:  200-499 mg/dL  Very High:  Greater than or equal to 500 mg/dL    Direct Measure HDL  Female:  Greater than or equal to 50 mg/dL   Male:  Greater than or equal to 40 mg/dL    LDL Cholesterol  Desirable:  <100mg/dL  Above Desirable:  100-129 mg/dL   Borderline High:  130-159 mg/dL   High:  160-189 mg/dL   Very High:  >= 190 mg/dL    Non HDL Cholesterol  Desirable:  130 mg/dL  Above Desirable:  130-159 mg/dL  Borderline High:  160-189 mg/dL  High:  190-219 mg/dL  Very High:  Greater than or equal to 220 mg/dL   PSA, screen   Result Value Ref Range    Prostate Specific Antigen Screen 1.57 0.00 - 3.50 ng/mL    Narrative    This result is obtained using the Roche Elecsys total PSA method on the semaj [id number] immunoassay analyzer. Results obtained with different assay methods or kits cannot be used interchangeably.       If you have any questions or concerns, please call the clinic at the number listed above.       Sincerely,      Dio Syed NP

## 2023-06-27 NOTE — PATIENT INSTRUCTIONS
Updating annual blood work today.    When you're due for them, refills of the simvastatin and lexapro will be at your pharmacy.    Covid booster given today.          Preventive Health Recommendations  Male Ages 50 - 64    Yearly exam:             See your health care provider every year in order to  o   Review health changes.   o   Discuss preventive care.    o   Review your medicines if your doctor has prescribed any.   Have a cholesterol test every 5 years, or more frequently if you are at risk for high cholesterol/heart disease.   Have a diabetes test (fasting glucose) every three years. If you are at risk for diabetes, you should have this test more often.   Have a colonoscopy at age 50, or have a yearly FIT test (stool test). These exams will check for colon cancer.    Talk with your health care provider about whether or not a prostate cancer screening test (PSA) is right for you.  You should be tested each year for STDs (sexually transmitted diseases), if you re at risk.     Shots: Get a flu shot each year. Get a tetanus shot every 10 years.     Nutrition:  Eat at least 5 servings of fruits and vegetables daily.   Eat whole-grain bread, whole-wheat pasta and brown rice instead of white grains and rice.   Get adequate Calcium and Vitamin D.     Lifestyle  Exercise for at least 150 minutes a week (30 minutes a day, 5 days a week). This will help you control your weight and prevent disease.   Limit alcohol to one drink per day.   No smoking.   Wear sunscreen to prevent skin cancer.   See your dentist every six months for an exam and cleaning.   See your eye doctor every 1 to 2 years.

## 2024-05-28 ENCOUNTER — PATIENT OUTREACH (OUTPATIENT)
Dept: CARE COORDINATION | Facility: CLINIC | Age: 54
End: 2024-05-28
Payer: COMMERCIAL

## 2024-06-04 ENCOUNTER — OFFICE VISIT (OUTPATIENT)
Dept: FAMILY MEDICINE | Facility: CLINIC | Age: 54
End: 2024-06-04
Payer: COMMERCIAL

## 2024-06-04 VITALS
BODY MASS INDEX: 27.77 KG/M2 | SYSTOLIC BLOOD PRESSURE: 108 MMHG | TEMPERATURE: 97.9 F | OXYGEN SATURATION: 97 % | DIASTOLIC BLOOD PRESSURE: 68 MMHG | HEIGHT: 73 IN | WEIGHT: 209.5 LBS | HEART RATE: 60 BPM | RESPIRATION RATE: 16 BRPM

## 2024-06-04 DIAGNOSIS — T75.3XXA MOTION SICKNESS, INITIAL ENCOUNTER: ICD-10-CM

## 2024-06-04 DIAGNOSIS — R53.83 OTHER FATIGUE: ICD-10-CM

## 2024-06-04 DIAGNOSIS — Z12.5 SCREENING FOR PROSTATE CANCER: ICD-10-CM

## 2024-06-04 DIAGNOSIS — E78.2 MIXED HYPERLIPIDEMIA: Primary | ICD-10-CM

## 2024-06-04 DIAGNOSIS — H93.13 TINNITUS, BILATERAL: ICD-10-CM

## 2024-06-04 DIAGNOSIS — F41.9 ANXIETY: ICD-10-CM

## 2024-06-04 DIAGNOSIS — D48.5 NEOPLASM OF UNCERTAIN BEHAVIOR OF SKIN: ICD-10-CM

## 2024-06-04 LAB
ERYTHROCYTE [DISTWIDTH] IN BLOOD BY AUTOMATED COUNT: 12.6 % (ref 10–15)
HCT VFR BLD AUTO: 44.8 % (ref 40–53)
HGB BLD-MCNC: 15.1 G/DL (ref 13.3–17.7)
MCH RBC QN AUTO: 30.3 PG (ref 26.5–33)
MCHC RBC AUTO-ENTMCNC: 33.7 G/DL (ref 31.5–36.5)
MCV RBC AUTO: 90 FL (ref 78–100)
PLATELET # BLD AUTO: 198 10E3/UL (ref 150–450)
RBC # BLD AUTO: 4.99 10E6/UL (ref 4.4–5.9)
WBC # BLD AUTO: 5.6 10E3/UL (ref 4–11)

## 2024-06-04 PROCEDURE — 85027 COMPLETE CBC AUTOMATED: CPT | Performed by: NURSE PRACTITIONER

## 2024-06-04 PROCEDURE — 84443 ASSAY THYROID STIM HORMONE: CPT | Performed by: NURSE PRACTITIONER

## 2024-06-04 PROCEDURE — G0103 PSA SCREENING: HCPCS | Performed by: NURSE PRACTITIONER

## 2024-06-04 PROCEDURE — 82306 VITAMIN D 25 HYDROXY: CPT | Performed by: NURSE PRACTITIONER

## 2024-06-04 PROCEDURE — 36415 COLL VENOUS BLD VENIPUNCTURE: CPT | Performed by: NURSE PRACTITIONER

## 2024-06-04 PROCEDURE — 80053 COMPREHEN METABOLIC PANEL: CPT | Performed by: NURSE PRACTITIONER

## 2024-06-04 PROCEDURE — 80061 LIPID PANEL: CPT | Performed by: NURSE PRACTITIONER

## 2024-06-04 PROCEDURE — 99214 OFFICE O/P EST MOD 30 MIN: CPT | Performed by: NURSE PRACTITIONER

## 2024-06-04 RX ORDER — MULTIVIT-MIN/IRON/FOLIC ACID/K 18-600-40
CAPSULE ORAL
COMMUNITY

## 2024-06-04 RX ORDER — SIMVASTATIN 20 MG
20 TABLET ORAL EVERY EVENING
Qty: 90 TABLET | Refills: 3 | Status: SHIPPED | OUTPATIENT
Start: 2024-06-04

## 2024-06-04 RX ORDER — SCOLOPAMINE TRANSDERMAL SYSTEM 1 MG/1
1 PATCH, EXTENDED RELEASE TRANSDERMAL
Qty: 2 PATCH | Refills: 0 | Status: SHIPPED | OUTPATIENT
Start: 2024-06-04

## 2024-06-04 RX ORDER — ESCITALOPRAM OXALATE 10 MG/1
10 TABLET ORAL DAILY
Qty: 90 TABLET | Refills: 3 | Status: SHIPPED | OUTPATIENT
Start: 2024-06-04

## 2024-06-04 ASSESSMENT — PATIENT HEALTH QUESTIONNAIRE - PHQ9
10. IF YOU CHECKED OFF ANY PROBLEMS, HOW DIFFICULT HAVE THESE PROBLEMS MADE IT FOR YOU TO DO YOUR WORK, TAKE CARE OF THINGS AT HOME, OR GET ALONG WITH OTHER PEOPLE: SOMEWHAT DIFFICULT
SUM OF ALL RESPONSES TO PHQ QUESTIONS 1-9: 14
SUM OF ALL RESPONSES TO PHQ QUESTIONS 1-9: 14

## 2024-06-04 ASSESSMENT — ANXIETY QUESTIONNAIRES
7. FEELING AFRAID AS IF SOMETHING AWFUL MIGHT HAPPEN: SEVERAL DAYS
8. IF YOU CHECKED OFF ANY PROBLEMS, HOW DIFFICULT HAVE THESE MADE IT FOR YOU TO DO YOUR WORK, TAKE CARE OF THINGS AT HOME, OR GET ALONG WITH OTHER PEOPLE?: SOMEWHAT DIFFICULT
2. NOT BEING ABLE TO STOP OR CONTROL WORRYING: MORE THAN HALF THE DAYS
3. WORRYING TOO MUCH ABOUT DIFFERENT THINGS: MORE THAN HALF THE DAYS
GAD7 TOTAL SCORE: 11
6. BECOMING EASILY ANNOYED OR IRRITABLE: NEARLY EVERY DAY
GAD7 TOTAL SCORE: 11
GAD7 TOTAL SCORE: 11
1. FEELING NERVOUS, ANXIOUS, OR ON EDGE: NEARLY EVERY DAY
4. TROUBLE RELAXING: NOT AT ALL
5. BEING SO RESTLESS THAT IT IS HARD TO SIT STILL: NOT AT ALL
IF YOU CHECKED OFF ANY PROBLEMS ON THIS QUESTIONNAIRE, HOW DIFFICULT HAVE THESE PROBLEMS MADE IT FOR YOU TO DO YOUR WORK, TAKE CARE OF THINGS AT HOME, OR GET ALONG WITH OTHER PEOPLE: SOMEWHAT DIFFICULT
7. FEELING AFRAID AS IF SOMETHING AWFUL MIGHT HAPPEN: SEVERAL DAYS

## 2024-06-04 ASSESSMENT — PAIN SCALES - GENERAL: PAINLEVEL: NO PAIN (0)

## 2024-06-04 NOTE — PROGRESS NOTES
Assessment & Plan     ICD-10-CM    1. Mixed hyperlipidemia  E78.2 Comprehensive metabolic panel (BMP + Alb, Alk Phos, ALT, AST, Total. Bili, TP)     Lipid panel     simvastatin (ZOCOR) 20 MG tablet     Comprehensive metabolic panel (BMP + Alb, Alk Phos, ALT, AST, Total. Bili, TP)     Lipid panel      2. Anxiety  F41.9 escitalopram (LEXAPRO) 10 MG tablet      3. Motion sickness, initial encounter  T75.3XXA scopolamine (TRANSDERM) 1 MG/3DAYS 72 hr patch      4. Screening for prostate cancer  Z12.5 PSA, screen     PSA, screen      5. Other fatigue  R53.83 CBC with platelets     Vitamin D Deficiency     TSH     CBC with platelets     Vitamin D Deficiency     TSH      6. Neoplasm of uncertain behavior of skin  D48.5 Adult Dermatology  Referral      7. Tinnitus, bilateral  H93.13         Offered further evaluation for tinnitus.  Declined for now.  Derm referral for lesion behind the ear appears to be benign nevi but is starting to become irritating.  Update annual labs.  Scopolamine patch sent for motion sickness.  Discussed ways to address anxiety and depressive symptoms.  Currently doing quite well.  Could have a seasonal affective disorder component.  Discussed sad lamp's and vitamin D supplementation over the winter months.    Subjective     HPI     Stress/anxiety  Continues with Lexapro 10 mg.  Was having quite a few issues at the start of the year.  Also was having some depressive/low mood/low energy problems.  That is considerably better over the past month.  He finds improvement with being outside, doing woodworking, and being around others.    Motion sickness  Next month will be going on a trip to [place] on a fishing expedition.  Would like something on hand for motion sickness      Mole  Noticed behind the right ear.  Would like it removed.  Has been present for many years now.    Tinnitus  Both ears. Ongstanding issue.  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  No ear pain.  No issues  "with hearing loss.  No issues hearing in crowded rooms with background noise.    Review of Systems - negative except for what's listed in the HPI      Objective    /68   Pulse 60   Temp 97.9  F (36.6  C) (Oral)   Resp 16   Ht 1.854 m (6' 1\")   Wt 95 kg (209 lb 8 oz)   SpO2 97%   BMI 27.64 kg/m    Physical Exam   General appearance - alert, well appearing, and in no distress  Mental status - alert, oriented to person, place, and time  Eyes -PERRLA  Ears - bilateral TM's and external ear canals normal  Nose - normal and patent, no erythema, discharge or polyps  Mouth - mucous membranes moist. No oral lesions.  Neck - no significant adenopathy  Lymphatics - no palpable lymphadenopathy  Chest - clear to auscultation, no wheezes, rales or rhonchi, symmetric air entry  Heart - normal rate and regular rhythm, S1 and S2 normal, no murmurs noted  Abdomen - soft, nontender, nondistended, no masses or organomegaly  Neurological - alert, oriented, normal speech, no focal findings or movement disorder noted, neck supple without rigidity, cranial nerves II through XII intact, motor and sensory grossly normal bilaterally, normal muscle tone, no tremors, strength 5/5  Musculoskeletal - no joint tenderness, deformity or swelling  Extremities - peripheral pulses normal, no peripheral edema  Skin - normal coloration and turgor.    Dio Syed, CNP    This note has been dictated using voice recognition software. Any grammatical or context distortions are unintentional and inherent to the software.    "

## 2024-06-04 NOTE — PATIENT INSTRUCTIONS
For the motion sickness I sent a couple scopolamine patches to your pharmacy.  Apply at least 6-12 hours prior to getting on the boat.    Some of the winter fatigue/depression could be seasonal affective disorder.  Those SAD lamps and 1000 IU of vitamin D may be helpful over the winter months.    Given how long ago the tick bite occurred, the fact that it was not engorged, and the fact that you showed no ill symptoms, it makes me think that we do not have to worry about it.    I did place the referral/contact information in your paperwork to get that growth behind the ear addressed.  You could schedule it now because they likely are booking out a few months.    We can always switch up your anxiety medicine if desired.  Like we talked about though, we can't predict potential side effects and efficacy.  Just send me a message if this is something you are interested in doing.    If ever interested in meeting with audiology for a full hearing evaluation, let me know.

## 2024-06-04 NOTE — LETTER
June 6, 2024      Jean IMAN Shankar  [address] [facility] [address]        Dear ,    Overall labs are looking okay.  Fasting blood sugar is mildly elevated.  Make sure to eat a well-balanced diet avoiding excessive carbohydrates/sugars and get exercise most days of the week.  Cholesterol levels are better than they were a year ago.  Prostate cancer screen is normal.  Current vitamin D levels look good along with blood counts and thyroid function     Resulted Orders   Comprehensive metabolic panel (BMP + Alb, Alk Phos, ALT, AST, Total. Bili, TP)   Result Value Ref Range    Sodium 140 135 - 145 mmol/L      Comment:      Reference intervals for this test were updated on 09/26/2023 to more accurately reflect our healthy population. There may be differences in the flagging of prior results with similar values performed with this method. Interpretation of those prior results can be made in the context of the updated reference intervals.     Potassium 4.6 3.4 - 5.3 mmol/L    Carbon Dioxide (CO2) 24 22 - 29 mmol/L    Anion Gap 10 7 - 15 mmol/L    Urea Nitrogen 11.0 6.0 - 20.0 mg/dL    Creatinine 0.98 0.67 - 1.17 mg/dL    GFR Estimate >90 >60 mL/min/1.73m2    Calcium 9.5 8.6 - 10.0 mg/dL    Chloride 106 98 - 107 mmol/L    Glucose 104 (H) 70 - 99 mg/dL    Alkaline Phosphatase 53 40 - 150 U/L    AST 31 0 - 45 U/L      Comment:      Reference intervals for this test were updated on 6/12/2023 to more accurately reflect our healthy population. There may be differences in the flagging of prior results with similar values performed with this method. Interpretation of those prior results can be made in the context of the updated reference intervals.    ALT 36 0 - 70 U/L      Comment:      Reference intervals for this test were updated on 6/12/2023 to more accurately reflect our healthy population. There may be differences in the flagging of prior results with similar values performed with this method. Interpretation of  those prior results can be made in the context of the updated reference intervals.      Protein Total 6.9 6.4 - 8.3 g/dL    Albumin 4.4 3.5 - 5.2 g/dL    Bilirubin Total 0.5 <=1.2 mg/dL    Patient Fasting > 8hrs? Yes    Lipid panel   Result Value Ref Range    Cholesterol 187 <200 mg/dL    Triglycerides 147 <150 mg/dL    Direct Measure HDL 47 >=40 mg/dL    LDL Cholesterol Calculated 111 (H) <=100 mg/dL    Non HDL Cholesterol 140 (H) <130 mg/dL    Patient Fasting > 8hrs? Yes     Narrative    Cholesterol  Desirable:  <200 mg/dL    Triglycerides  Normal:  Less than 150 mg/dL  Borderline High:  150-199 mg/dL  High:  200-499 mg/dL  Very High:  Greater than or equal to 500 mg/dL    Direct Measure HDL  Female:  Greater than or equal to 50 mg/dL   Male:  Greater than or equal to 40 mg/dL    LDL Cholesterol  Desirable:  <100mg/dL  Above Desirable:  100-129 mg/dL   Borderline High:  130-159 mg/dL   High:  160-189 mg/dL   Very High:  >= 190 mg/dL    Non HDL Cholesterol  Desirable:  130 mg/dL  Above Desirable:  130-159 mg/dL  Borderline High:  160-189 mg/dL  High:  190-219 mg/dL  Very High:  Greater than or equal to 220 mg/dL   PSA, screen   Result Value Ref Range    Prostate Specific Antigen Screen 1.95 0.00 - 3.50 ng/mL    Narrative    This result is obtained using the Roche Elecsys total PSA method on the semaj [id number] immunoassay analyzer. Results obtained with different assay methods or kits cannot be used interchangeably.   CBC with platelets   Result Value Ref Range    WBC Count 5.6 4.0 - 11.0 10e3/uL    RBC Count 4.99 4.40 - 5.90 10e6/uL    Hemoglobin 15.1 13.3 - 17.7 g/dL    Hematocrit 44.8 40.0 - 53.0 %    MCV 90 78 - 100 fL    MCH 30.3 26.5 - 33.0 pg    MCHC 33.7 31.5 - 36.5 g/dL    RDW 12.6 10.0 - 15.0 %    Platelet Count 198 150 - 450 10e3/uL   Vitamin D Deficiency   Result Value Ref Range    Vitamin D, Total (25-Hydroxy) 34 20 - 50 ng/mL      Comment:      optimum levels    Narrative    Season, race, dietary intake,  and treatment affect the concentration of 25-hydroxy-Vitamin D. Values may decrease during winter months and increase during summer months.    Vitamin D determination is routinely performed by an immunoassay specific for 25 hydroxyvitamin D3.  If an individual is on vitamin D2(ergocalciferol) supplementation, please specify 25 OH vitamin D2 and D3 level determination by LCMSMS test VITD23.     TSH   Result Value Ref Range    TSH 1.53 0.30 - 4.20 uIU/mL       If you have any questions or concerns, please call the clinic at the number listed above.       Sincerely,      Dio Syed NP

## 2024-06-05 LAB
ALBUMIN SERPL BCG-MCNC: 4.4 G/DL (ref 3.5–5.2)
ALP SERPL-CCNC: 53 U/L (ref 40–150)
ALT SERPL W P-5'-P-CCNC: 36 U/L (ref 0–70)
ANION GAP SERPL CALCULATED.3IONS-SCNC: 10 MMOL/L (ref 7–15)
AST SERPL W P-5'-P-CCNC: 31 U/L (ref 0–45)
BILIRUB SERPL-MCNC: 0.5 MG/DL
BUN SERPL-MCNC: 11 MG/DL (ref 6–20)
CALCIUM SERPL-MCNC: 9.5 MG/DL (ref 8.6–10)
CHLORIDE SERPL-SCNC: 106 MMOL/L (ref 98–107)
CHOLEST SERPL-MCNC: 187 MG/DL
CREAT SERPL-MCNC: 0.98 MG/DL (ref 0.67–1.17)
DEPRECATED HCO3 PLAS-SCNC: 24 MMOL/L (ref 22–29)
EGFRCR SERPLBLD CKD-EPI 2021: >90 ML/MIN/1.73M2
FASTING STATUS PATIENT QL REPORTED: YES
FASTING STATUS PATIENT QL REPORTED: YES
GLUCOSE SERPL-MCNC: 104 MG/DL (ref 70–99)
HDLC SERPL-MCNC: 47 MG/DL
LDLC SERPL CALC-MCNC: 111 MG/DL
NONHDLC SERPL-MCNC: 140 MG/DL
POTASSIUM SERPL-SCNC: 4.6 MMOL/L (ref 3.4–5.3)
PROT SERPL-MCNC: 6.9 G/DL (ref 6.4–8.3)
PSA SERPL DL<=0.01 NG/ML-MCNC: 1.95 NG/ML (ref 0–3.5)
SODIUM SERPL-SCNC: 140 MMOL/L (ref 135–145)
TRIGL SERPL-MCNC: 147 MG/DL
TSH SERPL DL<=0.005 MIU/L-ACNC: 1.53 UIU/ML (ref 0.3–4.2)
VIT D+METAB SERPL-MCNC: 34 NG/ML (ref 20–50)

## 2024-06-06 PROBLEM — R73.01 ELEVATED FASTING GLUCOSE: Status: ACTIVE | Noted: 2024-06-06

## 2024-06-11 ENCOUNTER — PATIENT OUTREACH (OUTPATIENT)
Dept: CARE COORDINATION | Facility: CLINIC | Age: 54
End: 2024-06-11
Payer: COMMERCIAL

## 2025-06-23 DIAGNOSIS — F41.9 ANXIETY: ICD-10-CM

## 2025-06-23 DIAGNOSIS — E78.2 MIXED HYPERLIPIDEMIA: ICD-10-CM

## 2025-06-24 RX ORDER — ESCITALOPRAM OXALATE 10 MG/1
10 TABLET ORAL DAILY
Qty: 30 TABLET | Refills: 0 | Status: SHIPPED | OUTPATIENT
Start: 2025-06-24

## 2025-06-24 RX ORDER — SIMVASTATIN 20 MG
20 TABLET ORAL EVERY EVENING
Qty: 30 TABLET | Refills: 0 | Status: SHIPPED | OUTPATIENT
Start: 2025-06-24

## 2025-07-17 ENCOUNTER — OFFICE VISIT (OUTPATIENT)
Dept: FAMILY MEDICINE | Facility: CLINIC | Age: 55
End: 2025-07-17
Payer: COMMERCIAL

## 2025-07-17 VITALS — HEART RATE: 70 BPM | TEMPERATURE: 97.9 F | SYSTOLIC BLOOD PRESSURE: 112 MMHG | DIASTOLIC BLOOD PRESSURE: 70 MMHG

## 2025-07-17 DIAGNOSIS — Z12.5 SCREENING FOR PROSTATE CANCER: ICD-10-CM

## 2025-07-17 DIAGNOSIS — Z00.00 ROUTINE GENERAL MEDICAL EXAMINATION AT A HEALTH CARE FACILITY: Primary | ICD-10-CM

## 2025-07-17 LAB
ALBUMIN SERPL BCG-MCNC: 4.3 G/DL (ref 3.5–5.2)
ALP SERPL-CCNC: 51 U/L (ref 40–150)
ALT SERPL W P-5'-P-CCNC: 28 U/L (ref 0–70)
ANION GAP SERPL CALCULATED.3IONS-SCNC: 9 MMOL/L (ref 7–15)
AST SERPL W P-5'-P-CCNC: 24 U/L (ref 0–45)
BILIRUB SERPL-MCNC: 0.4 MG/DL
BUN SERPL-MCNC: 16.1 MG/DL (ref 6–20)
CALCIUM SERPL-MCNC: 9.7 MG/DL (ref 8.8–10.4)
CHLORIDE SERPL-SCNC: 106 MMOL/L (ref 98–107)
CHOLEST SERPL-MCNC: 194 MG/DL
CREAT SERPL-MCNC: 0.97 MG/DL (ref 0.67–1.17)
EGFRCR SERPLBLD CKD-EPI 2021: >90 ML/MIN/1.73M2
FASTING STATUS PATIENT QL REPORTED: YES
FASTING STATUS PATIENT QL REPORTED: YES
GLUCOSE SERPL-MCNC: 111 MG/DL (ref 70–99)
HCO3 SERPL-SCNC: 25 MMOL/L (ref 22–29)
HDLC SERPL-MCNC: 43 MG/DL
LDLC SERPL CALC-MCNC: 131 MG/DL
NONHDLC SERPL-MCNC: 151 MG/DL
POTASSIUM SERPL-SCNC: 4.3 MMOL/L (ref 3.4–5.3)
PROT SERPL-MCNC: 6.8 G/DL (ref 6.4–8.3)
PSA SERPL DL<=0.01 NG/ML-MCNC: 2.36 NG/ML (ref 0–3.5)
SODIUM SERPL-SCNC: 140 MMOL/L (ref 135–145)
TRIGL SERPL-MCNC: 100 MG/DL

## 2025-07-17 RX ORDER — SIMVASTATIN 20 MG
20 TABLET ORAL EVERY EVENING
Qty: 90 TABLET | Refills: 3 | Status: SHIPPED | OUTPATIENT
Start: 2025-07-17

## 2025-07-17 RX ORDER — ESCITALOPRAM OXALATE 10 MG/1
10 TABLET ORAL DAILY
Qty: 90 TABLET | Refills: 3 | Status: SHIPPED | OUTPATIENT
Start: 2025-07-17

## 2025-07-17 RX ORDER — SCOPOLAMINE 1 MG/3D
1 PATCH, EXTENDED RELEASE TRANSDERMAL
Qty: 2 PATCH | Refills: 0 | Status: SHIPPED | OUTPATIENT
Start: 2025-07-17

## 2025-07-17 RX ORDER — SIMVASTATIN 20 MG
20 TABLET ORAL EVERY EVENING
Qty: 90 TABLET | Refills: 0 | Status: SHIPPED | OUTPATIENT
Start: 2025-07-17 | End: 2025-07-17

## 2025-07-17 RX ORDER — ESCITALOPRAM OXALATE 10 MG/1
10 TABLET ORAL DAILY
Qty: 90 TABLET | Refills: 0 | Status: SHIPPED | OUTPATIENT
Start: 2025-07-17 | End: 2025-07-17

## 2025-07-17 NOTE — PATIENT INSTRUCTIONS
Make sure to put the patch on at least 12 hours prior. Maybe 24 hours for you based on your [place] experience.     Updating labs.    Refills sent off.    Pneumonia shot given today.     Patient Education   Preventive Care Advice   This is general advice given by our system to help you stay healthy. However, your care team may have specific advice just for you. Please talk to your care team about your preventive care needs.  Nutrition  Eat 5 or more servings of fruits and vegetables each day.  Try wheat bread, brown rice and whole grain pasta (instead of white bread, rice, and pasta).  Get enough calcium and vitamin D. Check the label on foods and aim for 100% of the RDA (recommended daily allowance).  Lifestyle  Exercise at least 150 minutes each week  (30 minutes a day, 5 days a week).  Do muscle strengthening activities 2 days a week. These help control your weight and prevent disease.  No smoking.  Wear sunscreen to prevent skin cancer.  Have a dental exam and cleaning every 6 months.  Yearly exams  See your health care team every year to talk about:  Any changes in your health.  Any medicines your care team has prescribed.  Preventive care, family planning, and ways to prevent chronic diseases.  Shots (vaccines)   HPV shots (up to age 26), if you've never had them before.  Hepatitis B shots (up to age 59), if you've never had them before.  COVID-19 shot: Get this shot when it's due.  Flu shot: Get a flu shot every year.  Tetanus shot: Get a tetanus shot every 10 years.  Pneumococcal, hepatitis A, and RSV shots: Ask your care team if you need these based on your risk.  Shingles shot (for age 50 and up)  General health tests  Diabetes screening:  Starting at age 35, Get screened for diabetes at least every 3 years.  If you are younger than age 35, ask your care team if you should be screened for diabetes.  Cholesterol test: At age 39, start having a cholesterol test every 5 years, or more often if advised.  Bone  density scan (DEXA): At age 50, ask your care team if you should have this scan for osteoporosis (brittle bones).  Hepatitis C: Get tested at least once in your life.  STIs (sexually transmitted infections)  Before age 24: Ask your care team if you should be screened for STIs.  After age 24: Get screened for STIs if you're at risk. You are at risk for STIs (including HIV) if:  You are sexually active with more than one person.  You don't use condoms every time.  You or a partner was diagnosed with a sexually transmitted infection.  If you are at risk for HIV, ask about PrEP medicine to prevent HIV.  Get tested for HIV at least once in your life, whether you are at risk for HIV or not.  Cancer screening tests  Cervical cancer screening: If you have a cervix, begin getting regular cervical cancer screening tests starting at age 21.  Breast cancer scan (mammogram): If you've ever had breasts, begin having regular mammograms starting at age 40. This is a scan to check for breast cancer.  Colon cancer screening: It is important to start screening for colon cancer at age 45.  Have a colonoscopy test every 10 years (or more often if you're at risk) Or, ask your provider about stool tests like a FIT test every year or Cologuard test every 3 years.  To learn more about your testing options, visit:   .  For help making a decision, visit:   https://bit.ly/bn00058.  Prostate cancer screening test: If you have a prostate, ask your care team if a prostate cancer screening test (PSA) at age 55 is right for you.  Lung cancer screening: If you are a current or former smoker ages 50 to 80, ask your care team if ongoing lung cancer screenings are right for you.  For informational purposes only. Not to replace the advice of your health care provider. Copyright   2023 [employer]. All rights reserved. Clinically reviewed by the [facility] Transitions Program. [employer] 548146 - REV 01/24.

## 2025-07-17 NOTE — PROGRESS NOTES
Preventive Care Visit  [facility]  Dio Syed NP, Family Medicine  2025      Assessment & Plan       ICD-10-CM    1. Routine general medical examination at a health care facility  Z00.00       2. Screening for prostate cancer  Z12.5 PSA, screen     PSA, screen        Doing well.  Reviewed healthy lifestyle behaviors.  Prevnar 20 given.  Update screening lab.  Colonoscopy up-to-date.      Micheal Pena is a 54 year old, presenting for the following:  Here for annual  HPI  Doing well.  Will be having a visit to do [place] fishing up in [facility] and needs a refill of scopolamine.  Mood stable.  No side effects from simvastatin.    Advance Care Planning  Previously reviewed         No data to display                  25   Nutrition   Three or more servings of calcium each day? No   Diet: other         25   Exercise   Frequency of exercise: 1 day/week            25   Dental   Dentist two times every year? Yes       Today's PHQ-2 Score:       2024     6:51 AM   PHQ-2 (  [employer])   Q1: Little interest or pleasure in doing things 1   Q2: Feeling down, depressed or hopeless 3   PHQ-2 Score 4   Q1: Little interest or pleasure in doing things Several days   Q2: Feeling down, depressed or hopeless Nearly every day   PHQ-2 Score 4         25   Substance Use   Alcohol more than 3/day or more than 7/wk Not Applicable     Social History     Tobacco Use    Smoking status: Former     Current packs/day: 0.00     Types: Cigarettes, Cigars     Quit date: 2000     Years since quittin.5     Passive exposure: Past    Smokeless tobacco: Former   Vaping Use    Vaping status: Never Used   Substance Use Topics    Alcohol use: Yes     Comment: Alcoholic Drinks/day: less than one beer per week    Drug use: No     Last PSA:   Prostate Specific Antigen Screen   Date Value Ref Range Status   2024 1.95 0.00 - 3.50 ng/mL Final   2022 1.75 0.00 - 3.50  "ug/L Final     ASCVD Risk   The 10-year ASCVD risk score (Becca FOREMAN, et al., 2019) is: 4.8%    Values used to calculate the score:      Age: 54 years      Sex: Male      Is Non- : No      Diabetic: No      Tobacco smoker: No      Systolic Blood Pressure: 112 mmHg      Is BP treated: No      HDL Cholesterol: 47 mg/dL      Total Cholesterol: 187 mg/dL           Reviewed and updated as needed this visit by Provider        Surg Hx             No past medical history on file.  Past Surgical History:   Procedure Laterality Date    WISDOM TOOTH EXTRACTION  1993         Review of Systems  Constitutional, HEENT, cardiovascular, pulmonary, gi and gu systems are negative, except as otherwise noted.     Objective    Exam  /70 (BP Location: Left arm, Patient Position: Sitting, Cuff Size: Adult Regular)   Pulse 70   Temp 97.9  F (36.6  C) (Oral)    Estimated body mass index is 27.64 kg/m  as calculated from the following:    Height as of 6/4/24: 1.854 m (6' 1\").    Weight as of 6/4/24: 95 kg (209 lb 8 oz).    Physical Exam  GENERAL: alert and no distress  EYES: Eyes grossly normal to inspection, PERRL and conjunctivae and sclerae normal  HENT: ear canals and TM's normal, nose and mouth without ulcers or lesions  NECK: no adenopathy, no asymmetry, masses, or scars  RESP: lungs clear to auscultation - no rales, rhonchi or wheezes  CV: regular rate and rhythm, normal S1 S2, no S3 or S4, no murmur, click or rub, no peripheral edema  ABDOMEN: soft, nontender, no hepatosplenomegaly, no masses and bowel sounds normal  MS: no gross musculoskeletal defects noted, no edema  SKIN: no suspicious lesions or rashes  NEURO: Normal strength and tone, mentation intact and speech normal  PSYCH: mentation appears normal, affect normal/bright        Signed Electronically by: Dio Syed NP  "

## 2025-07-28 DIAGNOSIS — F41.9 ANXIETY: Primary | ICD-10-CM

## 2025-07-28 RX ORDER — ESCITALOPRAM OXALATE 10 MG/1
10 TABLET ORAL DAILY
Qty: 30 TABLET | Refills: 0 | OUTPATIENT
Start: 2025-07-28

## 2025-07-28 NOTE — TELEPHONE ENCOUNTER
Patient calling, says he spoke to the pharmacist and says that provider submitted the refill but cancelled it a day after.  Can a script be resent?

## 2025-07-28 NOTE — TELEPHONE ENCOUNTER
Contacts       Contact Date/Time Type Contact Phone/Fax    07/28/2025 11:28 AM CDT Phone (Incoming) Jean Shankar (Self) [phone] (H)    07/28/2025 12:44 PM CDT Phone (Outgoing) Jean Shankar (Self) [phone] (H)    Left Message           Attempted to reach patient to: Relay a message    Regarding: escitalopram refill    Action to take: OK to relay (verbatim): refill for escitalopram was sent to [facility] on 7/17/25 , qty of 90 days with 3 refills. Pt will need to call to have pharmacy fill this for me

## 2025-07-28 NOTE — TELEPHONE ENCOUNTER
Medication Question or Refill        What medication are you calling about (include dose and sig)?:     escitalopram (LEXAPRO) 10 MG tablet       Preferred Pharmacy:   [id number] - [place] - [address]  [address] [address]  Phone: [phone] Fax: [phone]      Controlled Substance Agreement on file:   CSA -- Patient Level:    CSA: None found at the patient level.       Who prescribed the medication?: PCP    Do you need a refill? Yes    When did you use the medication last? 7/26    Patient offered an appointment? No    Do you have any questions or concerns?  Yes: Pt is out of medication       Okay to leave a detailed message?: Yes at Cell number on file:    Telephone Information:   Mobile [phone]

## 2025-07-29 RX ORDER — ESCITALOPRAM OXALATE 10 MG/1
10 TABLET ORAL DAILY
Qty: 90 TABLET | Refills: 3 | Status: SHIPPED | OUTPATIENT
Start: 2025-07-29

## 2025-07-29 NOTE — TELEPHONE ENCOUNTER
Please call patient.  Please let him know that we did resend the Escitalopram to the pharmacy, but if still having issues with the pharmacy let us know and we will contact them ourselves.    Dio Syed, CNP